# Patient Record
Sex: FEMALE | Race: WHITE | NOT HISPANIC OR LATINO | Employment: PART TIME | URBAN - METROPOLITAN AREA
[De-identification: names, ages, dates, MRNs, and addresses within clinical notes are randomized per-mention and may not be internally consistent; named-entity substitution may affect disease eponyms.]

---

## 2017-02-23 ENCOUNTER — ALLSCRIPTS OFFICE VISIT (OUTPATIENT)
Dept: OTHER | Facility: OTHER | Age: 37
End: 2017-02-23

## 2017-03-08 ENCOUNTER — GENERIC CONVERSION - ENCOUNTER (OUTPATIENT)
Dept: OTHER | Facility: OTHER | Age: 37
End: 2017-03-08

## 2017-03-08 LAB
A/G RATIO (HISTORICAL): 1.9 (ref 1.1–2.5)
ALBUMIN SERPL BCP-MCNC: 4.3 G/DL (ref 3.5–5.5)
ALP SERPL-CCNC: 45 IU/L (ref 39–117)
ALT SERPL W P-5'-P-CCNC: 10 IU/L (ref 0–32)
AST SERPL W P-5'-P-CCNC: 15 IU/L (ref 0–40)
BILIRUB SERPL-MCNC: 0.4 MG/DL (ref 0–1.2)
BUN SERPL-MCNC: 13 MG/DL (ref 6–20)
BUN/CREA RATIO (HISTORICAL): 16 (ref 8–20)
CALCIUM SERPL-MCNC: 8.9 MG/DL (ref 8.7–10.2)
CHLORIDE SERPL-SCNC: 106 MMOL/L (ref 96–106)
CHOLEST SERPL-MCNC: 150 MG/DL (ref 100–199)
CO2 SERPL-SCNC: 23 MMOL/L (ref 18–29)
CREAT SERPL-MCNC: 0.79 MG/DL (ref 0.57–1)
EGFR AFRICAN AMERICAN (HISTORICAL): 111 ML/MIN/1.73
EGFR-AMERICAN CALC (HISTORICAL): 96 ML/MIN/1.73
GLUCOSE SERPL-MCNC: 106 MG/DL (ref 65–99)
HDLC SERPL-MCNC: 45 MG/DL
LDLC SERPL CALC-MCNC: 96 MG/DL (ref 0–99)
POTASSIUM SERPL-SCNC: 3.9 MMOL/L (ref 3.5–5.2)
SODIUM SERPL-SCNC: 141 MMOL/L (ref 134–144)
TOT. GLOBULIN, SERUM (HISTORICAL): 2.3 G/DL (ref 1.5–4.5)
TOTAL PROTEIN (HISTORICAL): 6.6 G/DL (ref 6–8.5)
TRIGL SERPL-MCNC: 47 MG/DL (ref 0–149)
TSH SERPL DL<=0.05 MIU/L-ACNC: 0.63 UIU/ML (ref 0.45–4.5)
VLDLC SERPL CALC-MCNC: 9 MG/DL (ref 5–40)

## 2017-07-24 ENCOUNTER — GENERIC CONVERSION - ENCOUNTER (OUTPATIENT)
Dept: OTHER | Facility: OTHER | Age: 37
End: 2017-07-24

## 2017-07-31 ENCOUNTER — GENERIC CONVERSION - ENCOUNTER (OUTPATIENT)
Dept: OTHER | Facility: OTHER | Age: 37
End: 2017-07-31

## 2017-07-31 LAB
AMBIG ABBREV CMP14 DEFAULT (HISTORICAL): NORMAL
BASOPHILS # BLD AUTO: 0 %
BASOPHILS # BLD AUTO: 0 X10E3/UL (ref 0–0.2)
DEPRECATED RDW RBC AUTO: 13.9 % (ref 12.3–15.4)
EOSINOPHIL # BLD AUTO: 0 %
EOSINOPHIL # BLD AUTO: 0 X10E3/UL (ref 0–0.4)
HCT VFR BLD AUTO: 35.8 % (ref 34–46.6)
HGB BLD-MCNC: 12.1 G/DL (ref 11.1–15.9)
IMM.GRANULOCYTES (CD4/8) (HISTORICAL): 0 %
IMM.GRANULOCYTES (CD4/8) (HISTORICAL): 0 X10E3/UL (ref 0–0.1)
LYMPHOCYTES # BLD AUTO: 1.7 X10E3/UL (ref 0.7–3.1)
LYMPHOCYTES # BLD AUTO: 31 %
MCH RBC QN AUTO: 30.9 PG (ref 26.6–33)
MCHC RBC AUTO-ENTMCNC: 33.8 G/DL (ref 31.5–35.7)
MCV RBC AUTO: 91 FL (ref 79–97)
MONOCYTES # BLD AUTO: 0.6 X10E3/UL (ref 0.1–0.9)
MONOCYTES (HISTORICAL): 10 %
NEUTROPHILS # BLD AUTO: 3.2 X10E3/UL (ref 1.4–7)
NEUTROPHILS # BLD AUTO: 59 %
PLATELET # BLD AUTO: 179 X10E3/UL (ref 150–379)
RBC (HISTORICAL): 3.92 X10E6/UL (ref 3.77–5.28)
WBC # BLD AUTO: 5.4 X10E3/UL (ref 3.4–10.8)

## 2017-08-01 LAB
A/G RATIO (HISTORICAL): 1.9 (ref 1.2–2.2)
ALBUMIN SERPL BCP-MCNC: 4.2 G/DL (ref 3.5–5.5)
ALP SERPL-CCNC: 41 IU/L (ref 39–117)
ALT SERPL W P-5'-P-CCNC: 10 IU/L (ref 0–32)
AST SERPL W P-5'-P-CCNC: 12 IU/L (ref 0–40)
BILIRUB SERPL-MCNC: 0.6 MG/DL (ref 0–1.2)
BUN SERPL-MCNC: 12 MG/DL (ref 6–20)
BUN/CREA RATIO (HISTORICAL): 18 (ref 9–23)
CALCIUM SERPL-MCNC: 9.1 MG/DL (ref 8.7–10.2)
CHLORIDE SERPL-SCNC: 104 MMOL/L (ref 96–106)
CO2 SERPL-SCNC: 24 MMOL/L (ref 18–29)
CREAT SERPL-MCNC: 0.68 MG/DL (ref 0.57–1)
EGFR AFRICAN AMERICAN (HISTORICAL): 129 ML/MIN/1.73
EGFR-AMERICAN CALC (HISTORICAL): 112 ML/MIN/1.73
GLUCOSE SERPL-MCNC: 93 MG/DL (ref 65–99)
POTASSIUM SERPL-SCNC: 4.3 MMOL/L (ref 3.5–5.2)
SODIUM SERPL-SCNC: 139 MMOL/L (ref 134–144)
TOT. GLOBULIN, SERUM (HISTORICAL): 2.2 G/DL (ref 1.5–4.5)
TOTAL PROTEIN (HISTORICAL): 6.4 G/DL (ref 6–8.5)

## 2018-01-15 NOTE — RESULT NOTES
Verified Results  [facility]) CMP14+eGFR [id number] 09:26AM Dangelo Snell     Test Name Result Flag Reference   Glucose, Serum 106 mg/dL H 65-99   BUN 13 mg/dL  6-20   Creatinine, Serum 0 79 mg/dL  0 57-1 00   eGFR If NonAfricn Am 96 mL/min/1 73  >59   eGFR If Africn Am 111 mL/min/1 73  >59   BUN/Creatinine Ratio 16  8-20   ALT (SGPT) 10 IU/L  0-32   Albumin, Serum 4 3 g/dL  3 5-5 5   Globulin, Total 2 3 g/dL  1 5-4 5   A/G Ratio 1 9  1 1-2 5   **Effective March 13, 2017 the reference interval**                   for A/G Ratio will be changing to: Age                Male          Female                           0 -  7 days       1 1 - 2 3       1 1 - 2 3                           8 - 30 days       1 2 - 2 8       1 2 - 2 8                           1 -  6 months     1 3 - 3 6       1 3 - 3 6                    7 months -  5 years      1 5 - 2 6       1 5 - 2 6                              > 5 years      1 2 - 2 2       1 2 - 2 2   Bilirubin, Total 0 4 mg/dL  0 0-1 2   Alkaline Phosphatase, S 45 IU/L     AST (SGOT) 15 IU/L  0-40   Sodium, Serum 141 mmol/L  134-144   Potassium, Serum 3 9 mmol/L  3 5-5 2   Chloride, Serum 106 mmol/L     Carbon Dioxide, Total 23 mmol/L  18-29   Calcium, Serum 8 9 mg/dL  8 7-10 2   Protein, Total, Serum 6 6 g/dL  6 0-8 5   Glucose, Serum 106 mg/dL H 65-99   BUN 13 mg/dL  6-20   Creatinine, Serum 0 79 mg/dL  0 57-1 00   eGFR If NonAfricn Am 96 mL/min/1 73  >59   eGFR If Africn Am 111 mL/min/1 73  >59   BUN/Creatinine Ratio 16  8-20   ALT (SGPT) 10 IU/L  0-32   Albumin, Serum 4 3 g/dL  3 5-5 5   Globulin, Total 2 3 g/dL  1 5-4 5   A/G Ratio 1 9  1 1-2 5   **Effective March 13, 2017 the reference interval**                   for A/G Ratio will be changing to:                               Age                Male          Female                           0 -  7 days       1 1 - 2 3       1 1 - 2 3                           8 - 30 days       1 2 - 2 8 1 2 - 2 8                           1 -  6 months     1 3 - 3 6       1 3 - 3 6                    7 months -  5 years      1 5 - 2 6       1 5 - 2 6                              > 5 years      1 2 - 2 2       1 2 - 2 2   Bilirubin, Total 0 4 mg/dL  0 0-1 2   Alkaline Phosphatase, S 45 IU/L     AST (SGOT) 15 IU/L  0-40   Sodium, Serum 141 mmol/L  134-144   Potassium, Serum 3 9 mmol/L  3 5-5 2   Chloride, Serum 106 mmol/L     Carbon Dioxide, Total 23 mmol/L  18-29   Calcium, Serum 8 9 mg/dL  8 7-10 2   Protein, Total, Serum 6 6 g/dL  6 0-8 5           (LC) Lipid Panel [id number] 09:26AM [employer]     Test Name Result Flag Reference   Cholesterol, Total 150 mg/dL  100-199   Triglycerides 47 mg/dL  0-149   HDL Cholesterol 45 mg/dL  >39   VLDL Cholesterol Carlo 9 mg/dL  5-40   LDL Cholesterol Calc 96 mg/dL  0-99   Cholesterol, Total 150 mg/dL  100-199   Triglycerides 47 mg/dL  0-149   HDL Cholesterol 45 mg/dL  >39   VLDL Cholesterol Carlo 9 mg/dL  5-40   LDL Cholesterol Calc 96 mg/dL  0-99           (1) TSH [id number] 09:26AM [employer]     Test Name Result Flag Reference   TSH 0 632 uIU/mL  0 450-4 500

## 2018-01-22 VITALS
SYSTOLIC BLOOD PRESSURE: 122 MMHG | HEIGHT: 67 IN | HEART RATE: 78 BPM | DIASTOLIC BLOOD PRESSURE: 72 MMHG | OXYGEN SATURATION: 99 % | WEIGHT: 179 LBS | BODY MASS INDEX: 28.09 KG/M2

## 2019-01-08 ENCOUNTER — HOSPITAL ENCOUNTER (EMERGENCY)
Facility: HOSPITAL | Age: 39
Discharge: HOME | End: 2019-01-08
Attending: EMERGENCY MEDICINE
Payer: COMMERCIAL

## 2019-01-08 ENCOUNTER — APPOINTMENT (EMERGENCY)
Dept: CT IMAGING | Facility: HOSPITAL | Age: 39
End: 2019-01-08
Payer: COMMERCIAL

## 2019-01-08 VITALS
OXYGEN SATURATION: 100 % | DIASTOLIC BLOOD PRESSURE: 64 MMHG | HEART RATE: 57 BPM | WEIGHT: 161 LBS | HEIGHT: 67 IN | SYSTOLIC BLOOD PRESSURE: 106 MMHG | BODY MASS INDEX: 25.27 KG/M2 | RESPIRATION RATE: 16 BRPM | TEMPERATURE: 98.1 F

## 2019-01-08 DIAGNOSIS — E05.90 HYPERTHYROIDISM: ICD-10-CM

## 2019-01-08 DIAGNOSIS — E04.1 THYROID NODULE: Primary | ICD-10-CM

## 2019-01-08 DIAGNOSIS — R20.2 PARESTHESIAS: ICD-10-CM

## 2019-01-08 LAB
ALBUMIN SERPL BCP-MCNC: 4 G/DL (ref 3.5–5)
ALP SERPL-CCNC: 53 U/L (ref 46–116)
ALT SERPL W P-5'-P-CCNC: 26 U/L (ref 12–78)
ANION GAP SERPL CALCULATED.3IONS-SCNC: 9 MMOL/L (ref 4–13)
AST SERPL W P-5'-P-CCNC: 13 U/L (ref 5–45)
BASOPHILS # BLD AUTO: 0.01 THOUSANDS/ΜL (ref 0–0.1)
BASOPHILS NFR BLD AUTO: 0 % (ref 0–1)
BILIRUB SERPL-MCNC: 0.7 MG/DL (ref 0.2–1)
BUN SERPL-MCNC: 15 MG/DL (ref 5–25)
CALCIUM SERPL-MCNC: 8.8 MG/DL (ref 8.3–10.1)
CHLORIDE SERPL-SCNC: 103 MMOL/L (ref 100–108)
CO2 SERPL-SCNC: 27 MMOL/L (ref 21–32)
CREAT SERPL-MCNC: 0.79 MG/DL (ref 0.6–1.3)
EOSINOPHIL # BLD AUTO: 0 THOUSAND/ΜL (ref 0–0.61)
EOSINOPHIL NFR BLD AUTO: 0 % (ref 0–6)
ERYTHROCYTE [DISTWIDTH] IN BLOOD BY AUTOMATED COUNT: 12.7 % (ref 11.6–15.1)
GFR SERPL CREATININE-BSD FRML MDRD: 95 ML/MIN/1.73SQ M
GLUCOSE SERPL-MCNC: 83 MG/DL (ref 65–140)
HCT VFR BLD AUTO: 41 % (ref 34.8–46.1)
HGB BLD-MCNC: 14.2 G/DL (ref 11.5–15.4)
IMM GRANULOCYTES # BLD AUTO: 0.01 THOUSAND/UL (ref 0–0.2)
IMM GRANULOCYTES NFR BLD AUTO: 0 % (ref 0–2)
LYMPHOCYTES # BLD AUTO: 2.01 THOUSANDS/ΜL (ref 0.6–4.47)
LYMPHOCYTES NFR BLD AUTO: 28 % (ref 14–44)
MAGNESIUM SERPL-MCNC: 1.9 MG/DL (ref 1.6–2.6)
MCH RBC QN AUTO: 30.8 PG (ref 26.8–34.3)
MCHC RBC AUTO-ENTMCNC: 34.6 G/DL (ref 31.4–37.4)
MCV RBC AUTO: 89 FL (ref 82–98)
MONOCYTES # BLD AUTO: 0.69 THOUSAND/ΜL (ref 0.17–1.22)
MONOCYTES NFR BLD AUTO: 10 % (ref 4–12)
NEUTROPHILS # BLD AUTO: 4.42 THOUSANDS/ΜL (ref 1.85–7.62)
NEUTS SEG NFR BLD AUTO: 62 % (ref 43–75)
PHOSPHATE SERPL-MCNC: 2.7 MG/DL (ref 2.7–4.5)
PLATELET # BLD AUTO: 228 THOUSANDS/UL (ref 149–390)
PMV BLD AUTO: 11.3 FL (ref 8.9–12.7)
POTASSIUM SERPL-SCNC: 3.6 MMOL/L (ref 3.5–5.3)
PROT SERPL-MCNC: 7.9 G/DL (ref 6.4–8.2)
RBC # BLD AUTO: 4.61 MILLION/UL (ref 3.81–5.12)
SODIUM SERPL-SCNC: 139 MMOL/L (ref 136–145)
TSH SERPL DL<=0.05 MIU/L-ACNC: 0.28 UIU/ML (ref 0.36–3.74)
WBC # BLD AUTO: 7.14 THOUSAND/UL (ref 4.31–10.16)

## 2019-01-08 PROCEDURE — 70498 CT ANGIOGRAPHY NECK: CPT

## 2019-01-08 PROCEDURE — 80053 COMPREHEN METABOLIC PANEL: CPT | Performed by: EMERGENCY MEDICINE

## 2019-01-08 PROCEDURE — 99285 EMERGENCY DEPT VISIT HI MDM: CPT

## 2019-01-08 PROCEDURE — 70496 CT ANGIOGRAPHY HEAD: CPT

## 2019-01-08 PROCEDURE — 85025 COMPLETE CBC W/AUTO DIFF WBC: CPT | Performed by: EMERGENCY MEDICINE

## 2019-01-08 PROCEDURE — 84100 ASSAY OF PHOSPHORUS: CPT | Performed by: EMERGENCY MEDICINE

## 2019-01-08 PROCEDURE — 36415 COLL VENOUS BLD VENIPUNCTURE: CPT | Performed by: EMERGENCY MEDICINE

## 2019-01-08 PROCEDURE — 84443 ASSAY THYROID STIM HORMONE: CPT | Performed by: EMERGENCY MEDICINE

## 2019-01-08 PROCEDURE — 83735 ASSAY OF MAGNESIUM: CPT | Performed by: EMERGENCY MEDICINE

## 2019-01-08 RX ADMIN — IOHEXOL 100 ML: 350 INJECTION, SOLUTION INTRAVENOUS at 17:19

## 2019-01-08 NOTE — DISCHARGE INSTRUCTIONS
IMPRESSION:     No acute intracranial abnormality      No focal stenosis or saccular aneurysm within the [place]      No hemodynamically significant stenosis within either common or internal carotid artery  Less than 50% stenosis by NASCET criteria     19 mm left thyroid lobe nodule  A nonemergent thyroid ultrasound is recommended for further characterization     TSH 0 283      Hyperthyroidism   WHAT YOU NEED TO KNOW:   Hyperthyroidism is a condition that develops when your thyroid hormone levels are high  Thyroid hormones help control body temperature, heart rate, growth, and weight  DISCHARGE INSTRUCTIONS:   Call 911 for any of the following:   · You have sudden chest pain or shortness of breath  · You have a seizure  · Your heart is beating faster than usual     · You feel like you are going to faint  Contact your healthcare provider if:   · You have a fever  · You feel nervous and restless  · You have chills, a cough, or feel weak and achy  · You run out of medicine or have stopped taking it  · You have questions or concerns about your condition or care  Medicines:   · Antithyroid medicines  decrease thyroid hormone levels and your symptoms  · Take your medicine as directed  Contact your healthcare provider if you think your medicine is not helping or if you have side effects  Tell him or her if you are allergic to any medicine  Keep a list of the medicines, vitamins, and herbs you take  Include the amounts, and when and why you take them  Bring the list or the pill bottles to follow-up visits  Carry your medicine list with you in case of an emergency  Rest as directed: You many need to avoid stressful and heavy physical activities  Slowly start to do more each day  Return to your daily activities as directed  Nutrition:  You may need to eat more to give your body the extra energy it needs  Foods high in protein will help prevent weight loss   Ask your healthcare provider which foods are best for you  Follow up with your healthcare provider as directed: You may need to return for more blood tests to check your thyroid hormone level  This will show if you are getting the right amount of medicine  Do not stop taking your medicines without talking to your healthcare provider first  Write down your questions so you remember to ask them during your visits  © 2017 [employer] Information is for End User's use only and may not be sold, redistributed or otherwise used for commercial purposes  All illustrations and images included in CareNotes® are the copyrighted property of [employer] , [employer]  or Tato Swartz  The above information is an  only  It is not intended as medical advice for individual conditions or treatments  Talk to your doctor, nurse or pharmacist before following any medical regimen to see if it is safe and effective for you

## 2019-01-08 NOTE — ED NOTES
IV discontinued by Dr Lynette Brooks and discharge instructions given     Sharlene Llanos, PASCALE  01/08/19 9401

## 2019-01-08 NOTE — ED NOTES
Pt reports first having issues with vision 12/31/2018 while she was at work  Pt states " I was trying to write an email and remember being like I can't even read this  And my coworkers said there wasn't anything on the screen"   Pt continues "Yesterday at [facility] I had just had like this issue with my speech like I knew what I wanted to say but just couldn't say it"     Shruthi Holden RN  01/08/19 8151

## 2019-01-08 NOTE — ED PROVIDER NOTES
History  Chief Complaint   Patient presents with    CVA/TIA-like Symptoms     Patient states she was at [facility] last nioght and had an episode that last 15 minutes, she was having difficulties speaching and had garbled speech, Patient states she is still having issues with her periopheral vision  which is intermittent  She states her vision is blurry on the L side  HPI     43 yof p/w multiple complaints  Patient has been having recurrent issues since new years whrre she gets perioral numbness, tunnel vision for a few minutes at a time  Patient reports last night she was a [employer] and felt like she had an episode of word finding  She felt better so didn't come to the hospital  She reports feeling ok today but feels like she has alternating eye visual changes  she denies headache, weakness, cp, sob  No other a/e factor  Intermitent  Patient does admit to a lot of stress  She appears very stressful on exam and becomes tachycardic when talking about her symptoms  Exam reveals a normal neurologic exam without any deficits  visual fields full to confrontation  CN intact  A/P: dysarthria, visual field changes  Inconsistent symptoms  No risk factors for TIA  Normal neurologic exam  Will however check CTA, labs  Regardless of findings will need to follow up with pcp  None       History reviewed  No pertinent past medical history  Past Surgical History:   Procedure Laterality Date    ABDOMINAL SURGERY      APPENDECTOMY         Family History   Problem Relation Age of Onset    Hypertension Mother     Hyperlipidemia Mother     Hypertension Father     Hyperlipidemia Father      I have reviewed and agree with the history as documented  Social History   Substance Use Topics    Smoking status: Never Smoker    Smokeless tobacco: Never Used    Alcohol use Yes        Review of Systems   Constitutional: Negative for chills, fatigue and fever  Eyes: Negative for photophobia and visual disturbance  Respiratory: Negative for cough and shortness of breath  Cardiovascular: Negative for chest pain, palpitations and leg swelling  Gastrointestinal: Negative for diarrhea, nausea and vomiting  Endocrine: Negative for polydipsia and polyuria  Genitourinary: Negative for decreased urine volume, difficulty urinating, dysuria and frequency  Musculoskeletal: Negative for back pain, neck pain and neck stiffness  Skin: Negative for color change and rash  Allergic/Immunologic: Negative for environmental allergies and immunocompromised state  Neurological: Positive for speech difficulty  Negative for dizziness and headaches  Hematological: Negative for adenopathy  Does not bruise/bleed easily  Psychiatric/Behavioral: Negative for dysphoric mood  The patient is not nervous/anxious  Physical Exam  Physical Exam   Constitutional: She is oriented to person, place, and time  She appears well-developed and well-nourished  No distress  HENT:   Head: Normocephalic and atraumatic  Nose: Nose normal    Eyes: Pupils are equal, round, and reactive to light  Conjunctivae and EOM are normal  No scleral icterus  Neck: Normal range of motion  Neck supple  No JVD present  No tracheal deviation present  No thyromegaly present  Cardiovascular: Normal rate, regular rhythm, normal heart sounds and intact distal pulses  Exam reveals no gallop and no friction rub  Pulmonary/Chest: Effort normal and breath sounds normal  No respiratory distress  She has no wheezes  She has no rales  She exhibits no tenderness  Abdominal: Soft  Bowel sounds are normal  She exhibits no distension and no mass  There is no tenderness  There is no rebound and no guarding  No hernia  Musculoskeletal: Normal range of motion  She exhibits no edema, tenderness or deformity  Neurological: She is alert and oriented to person, place, and time  She has normal reflexes  No cranial nerve deficit   Coordination normal    Skin: Skin is warm and dry  She is not diaphoretic  No erythema  Psychiatric: She has a normal mood and affect  Her behavior is normal    Nursing note and vitals reviewed  Vital Signs  ED Triage Vitals [01/08/19 1437]   Temperature Pulse Respirations Blood Pressure SpO2   98 1 °F (36 7 °C) 82 (!) 24 150/100 100 %      Temp src Heart Rate Source Patient Position - Orthostatic VS BP Location FiO2 (%)   -- -- -- -- --      Pain Score       No Pain           Vitals:    01/08/19 1600 01/08/19 1615 01/08/19 1630 01/08/19 1645   BP: 108/69 106/67 106/65 106/64   Pulse: 62 60 57 57       Visual Acuity  Visual Acuity      Most Recent Value   L Pupil Size (mm)  3   R Pupil Size (mm)  3          ED Medications  Medications   iohexol (OMNIPAQUE) 350 MG/ML injection (MULTI-DOSE) 100 mL (100 mL Intravenous Given 1/8/19 1719)       Diagnostic Studies  Results Reviewed     Procedure Component Value Units Date/Time    Comprehensive metabolic panel [[id number]] Collected:  01/08/19 1530    Lab Status:  Final result Specimen:  Blood from Arm, Right Updated:  01/08/19 1615     Sodium 139 mmol/L      Potassium 3 6 mmol/L      Chloride 103 mmol/L      CO2 27 mmol/L      ANION GAP 9 mmol/L      BUN 15 mg/dL      Creatinine 0 79 mg/dL      Glucose 83 mg/dL      Calcium 8 8 mg/dL      AST 13 U/L      ALT 26 U/L      Alkaline Phosphatase 53 U/L      Total Protein 7 9 g/dL      Albumin 4 0 g/dL      Total Bilirubin 0 70 mg/dL      eGFR 95 ml/min/1 73sq m     Narrative:         National Kidney Disease Education Program recommendations are as follows:  GFR calculation is accurate only with a steady state creatinine  Chronic Kidney disease less than 60 ml/min/1 73 sq  meters  Kidney failure less than 15 ml/min/1 73 sq  meters      TSH [[id number]]  (Abnormal) Collected:  01/08/19 1530    Lab Status:  Final result Specimen:  Blood from Arm, Right Updated:  01/08/19 1615     TSH 3RD GENERATON 0 283 (L) uIU/mL     Narrative:         Patients undergoing fluorescein dye angiography may retain small amounts of fluorescein in the body for 48-72 hours post procedure  Samples containing fluorescein can produce falsely depressed TSH values  If the patient had this procedure,a specimen should be resubmitted post fluorescein clearance  The recommended reference ranges for TSH during pregnancy are as follows:  First trimester 0 1 to 2 5 uIU/mL  Second trimester  0 2 to 3 0 uIU/mL  Third trimester 0 3 to 3 0 uIU/m      Magnesium [[id number]]  (Normal) Collected:  01/08/19 1530    Lab Status:  Final result Specimen:  Blood from Arm, Right Updated:  01/08/19 1615     Magnesium 1 9 mg/dL     Phosphorus [[id number]]  (Normal) Collected:  01/08/19 1530    Lab Status:  Final result Specimen:  Blood from Arm, Right Updated:  01/08/19 1615     Phosphorus 2 7 mg/dL     CBC and differential [[id number]]  (Normal) Collected:  01/08/19 1530    Lab Status:  Final result Specimen:  Blood from Arm, Right Updated:  01/08/19 1548     WBC 7 14 Thousand/uL      RBC 4 61 Million/uL      Hemoglobin 14 2 g/dL      Hematocrit 41 0 %      MCV 89 fL      MCH 30 8 pg      MCHC 34 6 g/dL      RDW 12 7 %      MPV 11 3 fL      Platelets 485 Thousands/uL      Neutrophils Relative 62 %      Immat GRANS % 0 %      Lymphocytes Relative 28 %      Monocytes Relative 10 %      Eosinophils Relative 0 %      Basophils Relative 0 %      Neutrophils Absolute 4 42 Thousands/µL      Immature Grans Absolute 0 01 Thousand/uL      Lymphocytes Absolute 2 01 Thousands/µL      Monocytes Absolute 0 69 Thousand/µL      Eosinophils Absolute 0 00 Thousand/µL      Basophils Absolute 0 01 Thousands/µL                  CTA head and neck with and without contrast   Final Result by Tabitha Del Castillo MD (01/08 9701)      No acute intracranial abnormality  No focal stenosis or saccular aneurysm within the [place] of [place]  No hemodynamically significant stenosis within either common or internal carotid artery  Less than 50% stenosis by NASCET criteria  19 mm left thyroid lobe nodule  A nonemergent thyroid ultrasound is recommended for further characterization            Workstation performed: [id number]                    Procedures  Procedures       Phone Contacts  ED Phone Contact    ED Course                               MDM  Number of Diagnoses or Management Options  Hyperthyroidism: new and requires workup  Paresthesias: new and requires workup  Thyroid nodule: new and requires workup  Diagnosis management comments: Patient had a normal cta other than thyroid nodule- she reports knowing about this but never had biopsy  tsh low  Will f u pcp  If symptoms persist that she has been having, patient will need to follow up with pcp or if symptoms last for longer than a few seconds, return to ED       Amount and/or Complexity of Data Reviewed  Clinical lab tests: reviewed and ordered  Tests in the radiology section of CPT®: reviewed and ordered  Tests in the medicine section of CPT®: ordered and reviewed  Independent visualization of images, tracings, or specimens: yes    Patient Progress  Patient progress: stable    CritCare Time    Disposition  Final diagnoses:   Thyroid nodule   Hyperthyroidism   Paresthesias     Time reflects when diagnosis was documented in both MDM as applicable and the Disposition within this note     Time User Action Codes Description Comment    1/8/2019  5:58 PM Dede Lundborg Add [E04 1] Thyroid nodule     1/8/2019  5:58 PM Dede Lundborg Add [E05 90] Hyperthyroidism     1/9/2019 11:26 AM Dede Lundborg Add [R20 2] Paresthesias       ED Disposition     ED Disposition Condition Comment    Discharge  Carolyn Chiu discharge to home/self care      Condition at discharge: Good        Follow-up Information     Follow up With Specialties Details Why Contact Info Additional [address] [facility] Emergency Department Emergency Medicine   [address] [place] [address] [address] [place] [address]  0 [facility], [place] [address], [place], [place], [address], MD Family Medicine In 1 week  [address]  [address] [facility] [address]  [address] [address] [phone]             There are no discharge medications for this patient  No discharge procedures on file      ED Provider  Electronically Signed by           Mireya Tian DO  01/09/19 1128

## 2019-01-09 DIAGNOSIS — G45.9 TIA (TRANSIENT ISCHEMIC ATTACK): ICD-10-CM

## 2019-01-09 DIAGNOSIS — E55.9 VITAMIN D DEFICIENCY: ICD-10-CM

## 2019-01-09 DIAGNOSIS — R53.83 FATIGUE, UNSPECIFIED TYPE: Primary | ICD-10-CM

## 2019-01-09 DIAGNOSIS — E53.8 VITAMIN B12 DEFICIENCY: ICD-10-CM

## 2019-01-10 LAB
25(OH)D3+25(OH)D2 SERPL-MCNC: 23.2 NG/ML (ref 30–100)
ENDOMYSIUM IGA SER QL: NEGATIVE
ERYTHROCYTE [SEDIMENTATION RATE] IN BLOOD BY WESTERGREN METHOD: 2 MM/HR (ref 0–32)
GLIADIN PEPTIDE IGA SER-ACNC: 4 UNITS (ref 0–19)
GLIADIN PEPTIDE IGG SER-ACNC: 2 UNITS (ref 0–19)
IGA SERPL-MCNC: 188 MG/DL (ref 87–352)
THYROGLOB AB SERPL-ACNC: <1 IU/ML (ref 0–0.9)
THYROGLOB SERPL-MCNC: 39.8 NG/ML (ref 1.5–38.5)
THYROPEROXIDASE AB SERPL-ACNC: 11 IU/ML (ref 0–34)
TTG IGA SER-ACNC: <2 U/ML (ref 0–3)
TTG IGG SER-ACNC: <2 U/ML (ref 0–5)
VIT B12 SERPL-MCNC: 1016 PG/ML (ref 232–1245)

## 2019-01-15 ENCOUNTER — VBI (OUTPATIENT)
Dept: ADMINISTRATIVE | Facility: OTHER | Age: 39
End: 2019-01-15

## 2019-01-15 NOTE — TELEPHONE ENCOUNTER
Henrik Duran    ED Visit Information     Ed visit date: 01/08/2019  Diagnosis Description: Thyroid nodule; Hyperthyroidism  In Network? Yes [address]  Discharge status: Home  Discharged with meds ? No  Number of ED visits to date: 1  ED Severity:N/a     Outreach Information    Outreach successful: Yes 1  Date letter mailed:n/a  Date Finalized:01/15/2019    Care Coordination    Follow up appointment with pcp: no None scheduled  Transportation issues ? NA    Value [employer] type:  7 Day Outreach  Emergent necessity warranted by diagnosis:  Yes  [facility] PCP:  Yes  Transportation:  Self Transport  Called PCP first?:  No  Feels able to call PCP for urgent problems ?:  Yes  Understands what emergencies can be handled by PCP ?:  Yes  Practice Contacted Patient ?:  No  Pt had ED follow up with pcp/staff ?:  Yes Call with NO visit scheduled   Reason Patient went to ED instead of Urgent Care or PCP?:  Perceived Severity of Illness  Urgent care Education?:  No  01/15/2019 09:28 AM Phone (Kerry Martins) [place] (Self) [phone] (H)   Call Complete    Personal communication with patient regarding her recent ED visit on 01/08/2019 for Thyroid nodule; Hyperthyroidism  Rachelle Jenkins stated that she is feeling fine  Patient was discharged with without medication and was advised to follow up with Dr Freitas within 7 days of visit  She stated that she spoke with Dr Whitley Farrar and he ordered additional labs  Patient had an appt scheduled for 01/14 but she canceled it  Patient does not meet OPCM criteria  I was not able to ask follow up questions

## 2020-08-31 ENCOUNTER — OFFICE VISIT (OUTPATIENT)
Dept: OBGYN CLINIC | Facility: CLINIC | Age: 40
End: 2020-08-31
Payer: COMMERCIAL

## 2020-08-31 VITALS
BODY MASS INDEX: 27.78 KG/M2 | DIASTOLIC BLOOD PRESSURE: 76 MMHG | TEMPERATURE: 98 F | HEIGHT: 67 IN | SYSTOLIC BLOOD PRESSURE: 122 MMHG | WEIGHT: 177 LBS

## 2020-08-31 DIAGNOSIS — Z12.4 CERVICAL CANCER SCREENING: Primary | ICD-10-CM

## 2020-08-31 DIAGNOSIS — Z30.431 ENCOUNTER FOR ROUTINE CHECKING OF INTRAUTERINE CONTRACEPTIVE DEVICE (IUD): ICD-10-CM

## 2020-08-31 DIAGNOSIS — Z12.31 ENCOUNTER FOR SCREENING MAMMOGRAM FOR MALIGNANT NEOPLASM OF BREAST: ICD-10-CM

## 2020-08-31 DIAGNOSIS — Z01.419 ENCOUNTER FOR GYNECOLOGICAL EXAMINATION WITHOUT ABNORMAL FINDING: ICD-10-CM

## 2020-08-31 DIAGNOSIS — Z11.51 SCREENING FOR HPV (HUMAN PAPILLOMAVIRUS): ICD-10-CM

## 2020-08-31 PROCEDURE — 87624 HPV HI-RISK TYP POOLED RSLT: CPT | Performed by: OBSTETRICS & GYNECOLOGY

## 2020-08-31 PROCEDURE — 99386 PREV VISIT NEW AGE 40-64: CPT | Performed by: OBSTETRICS & GYNECOLOGY

## 2020-08-31 PROCEDURE — G0145 SCR C/V CYTO,THINLAYER,RESCR: HCPCS | Performed by: OBSTETRICS & GYNECOLOGY

## 2020-08-31 NOTE — PROGRESS NOTES
CC:  Annual exam    HPI: Michelle Clark presents for routine gyn exam   The patient is new to our office and offers no complaints or concerns at this time  She does have a Mirena IUD which is nearing its expiration date next month  She would like a replacement  Past Medical History:  History reviewed  No pertinent past medical history  Past Surgical History:  Past Surgical History:   Procedure Laterality Date    ABDOMINAL SURGERY      APPENDECTOMY         Past OB/Gyn History:  Menstrual cycles are absent secondary to the Mirena affect  Denies any history of sexually transmitted infection  No history of abnormal pap smears  Her last pap smear was 2018 or 2017 but was normal     ALLERGIES: No Known Allergies    MEDS: No current outpatient medications on file  Family History:  Family History   Problem Relation Age of Onset   Alma Delia Daliay Hypertension Mother     Hyperlipidemia Mother     Hypertension Father     Hyperlipidemia Father        Social History:  Social History     Socioeconomic History    Marital status: /Civil Union     Spouse name: Not on file    Number of children: Not on file    Years of education: Not on file    Highest education level: Not on file   Occupational History    Not on file   Social Needs    Financial resource strain: Not on file    Food insecurity     Worry: Not on file     Inability: Not on file   [employer] needs     Medical: Not on file     Non-medical: Not on file   Tobacco Use    Smoking status: Never Smoker    Smokeless tobacco: Never Used   Substance and Sexual Activity    Alcohol use:  Yes    Drug use: No    Sexual activity: Not on file   Lifestyle    Physical activity     Days per week: Not on file     Minutes per session: Not on file    Stress: Not on file   Relationships    Social connections     Talks on phone: Not on file     Gets together: Not on file     Attends Shinto service: Not on file     Active member of club or organization: Not on file     Attends meetings of clubs or organizations: Not on file     Relationship status: Not on file    Intimate partner violence     Fear of current or ex partner: Not on file     Emotionally abused: Not on file     Physically abused: Not on file     Forced sexual activity: Not on file   Other Topics Concern    Not on file   Social History Narrative    Not on file         Review of Systems:  Gen:   Denies fatigue, chills, nausea, vomiting, fever  Skin: No rashes or discolorations of any concern  RESP: Denies SOB, no cough  CV: Denies chest pain or palpitations  Breasts: Denies masses, pain, skin changes and nipple discharge  GI: Denies abdominal pain, heartburn, nausea, vomiting, changes in bowel habits  : Denies dysuria, frequency, CVA tenderness, incontinence and hematuria  Genitalia: Denies abnormal vaginal discharge, external lesions, rashes, pelvic pain, pressure, abnormal bleeding  Rectal:  Denies pain, bleeding, hemorrhoids,    Physical Exam:  /76   Temp 98 °F (36 7 °C)   Ht 5' 7" (1 702 m)   Wt 80 3 kg (177 lb)   BMI 27 72 kg/m²    Gen: The patient was alert and oriented x3, pleasant well-appearing female in no acute distress  Neck:  Unremarkable, no lymphadenopathy, no thyromegaly, or tenderness  CV:  RRR, no murmurs  Resp:  Clear to auscultation bilaterally, no wheezing  Breasts: Symmetric  No dominant, discrete, fixed  or suspicious masses are noted  No skin or nipple changes  No palpable axillary nodes  No supraclavicular adenopathy  Abd:  Soft, nontender, nondistended, no masses or organomegaly  Back:  No CVA tenderness, no tenderness to palpation along spine  Pelvic:  Normal appearing external female genitalia, no visible lesions, no rashes  Vagina is free of discharge, normal vaginal epithelium, no abnormal  lesions, no evidence of prolapse anteriorly or posteriorly  Normal appearing cervix, mobile and nontender  A thin prep pap smear was obtained today     Uterus is normal size, mobile and, nontender  No palpable adnexal masses or tenderness  No anoperineal lesions  Skin:  No concerning lesions  Extremeties: No edema      Assessment & Plan:   1  Routine annual exam      RTO one year orPRN  2  Encounter for cancer screening, Pap smear performed  3   Contraception, patient to return for IUD removal and replacement       4   Encounter for mammogram screening

## 2020-09-01 DIAGNOSIS — Z97.5 CONTRACEPTIVE DEVICE, INTRAUTERINE: ICD-10-CM

## 2020-09-01 DIAGNOSIS — Z97.5 CONTRACEPTIVE DEVICE, INTRAUTERINE: Primary | ICD-10-CM

## 2020-09-01 NOTE — TELEPHONE ENCOUNTER
Insurance called, was told that she can pick her IUD up at [facility]??  lets reorder  Opal Hernandez

## 2020-09-02 LAB
HPV HR 12 DNA CVX QL NAA+PROBE: NEGATIVE
HPV16 DNA CVX QL NAA+PROBE: NEGATIVE
HPV18 DNA CVX QL NAA+PROBE: NEGATIVE

## 2020-09-04 LAB
LAB AP GYN PRIMARY INTERPRETATION: NORMAL
Lab: NORMAL

## 2020-09-21 ENCOUNTER — OFFICE VISIT (OUTPATIENT)
Dept: OBGYN CLINIC | Facility: CLINIC | Age: 40
End: 2020-09-21
Payer: COMMERCIAL

## 2020-09-21 VITALS
DIASTOLIC BLOOD PRESSURE: 82 MMHG | SYSTOLIC BLOOD PRESSURE: 120 MMHG | BODY MASS INDEX: 28.04 KG/M2 | TEMPERATURE: 97.8 F | WEIGHT: 179 LBS

## 2020-09-21 DIAGNOSIS — Z30.433 ENCOUNTER FOR REMOVAL AND REINSERTION OF INTRAUTERINE CONTRACEPTIVE DEVICE (IUD): ICD-10-CM

## 2020-09-21 DIAGNOSIS — Z97.5 CONTRACEPTIVE DEVICE, INTRAUTERINE: Primary | ICD-10-CM

## 2020-09-21 LAB — SL AMB POCT URINE HCG: NORMAL

## 2020-09-21 PROCEDURE — 81025 URINE PREGNANCY TEST: CPT | Performed by: OBSTETRICS & GYNECOLOGY

## 2020-09-21 PROCEDURE — 58301 REMOVE INTRAUTERINE DEVICE: CPT | Performed by: OBSTETRICS & GYNECOLOGY

## 2020-09-21 PROCEDURE — 58300 INSERT INTRAUTERINE DEVICE: CPT | Performed by: OBSTETRICS & GYNECOLOGY

## 2020-09-21 NOTE — PROGRESS NOTES
Iud removal    Date/Time: 2020 4:40 PM  Performed by: Abbi Muñoz MD  Authorized by: Abbi Muñoz MD     Consent:     Consent obtained:  Written    Consent given by:  Patient    Procedure risks and benefits discussed: yes      Patient questions answered: yes      Patient agrees, verbalizes understanding, and wants to proceed: yes      Educational handouts given: no      Instructions and paperwork completed: yes    Procedure:     Removed with no complications: yes      Removal due to mechanical complications of IUD: no      Removal due to infection and inflammatory reaction: no      Other reason for removal:      Iud insertions    Date/Time: 2020 4:40 PM  Performed by: Abbi Muñoz MD  Authorized by:  Abbi Muñoz MD     Consent:     Consent obtained:  Written    Consent given by:  Patient    Procedure risks and benefits discussed: yes      Patient questions answered: yes      Patient agrees, verbalizes understanding, and wants to proceed: yes      Educational handouts given: yes      Instructions and paperwork completed: yes    Procedure:     Pelvic exam performed: yes      Negative urine pregnancy test: yes      Cervix cleaned and prepped: yes      Speculum placed in vagina: yes      Tenaculum applied to cervix: yes      Uterus sounded: yes      Uterus sound depth (cm):  8    IUD inserted with no complications: yes      IUD type:  Mirena    Strings trimmed: yes    Post-procedure:     Patient tolerated procedure well: yes      Patient will follow up after next period: no

## 2020-11-17 ENCOUNTER — HOSPITAL ENCOUNTER (OUTPATIENT)
Dept: BONE DENSITY | Facility: IMAGING CENTER | Age: 40
Discharge: HOME | End: 2020-11-17
Payer: COMMERCIAL

## 2020-11-17 VITALS — HEIGHT: 67 IN | BODY MASS INDEX: 28.25 KG/M2 | WEIGHT: 180 LBS

## 2020-11-17 DIAGNOSIS — Z12.31 ENCOUNTER FOR SCREENING MAMMOGRAM FOR MALIGNANT NEOPLASM OF BREAST: ICD-10-CM

## 2020-11-17 PROCEDURE — 77063 BREAST TOMOSYNTHESIS BI: CPT

## 2020-11-17 PROCEDURE — 77067 SCR MAMMO BI INCL CAD: CPT

## 2021-02-09 ENCOUNTER — TELEPHONE (OUTPATIENT)
Dept: FAMILY MEDICINE CLINIC | Facility: CLINIC | Age: 41
End: 2021-02-09

## 2021-02-09 NOTE — LETTER
Pt Name and Address    Dear Farrukh   Records from Insurance indicate that you are a patient of Ha Frazier MD, with [facility], [facility]  Please call the office to establish care and schedule your annual physical today at [phone]  If you are seeing a primary care provider other than the one assigned to you by your insurance, you can simply call the number on the back of your insurance card to change your primary care physician with your insurance company  We thank you for choosing [address] for your healthcare needs      Sincerely,    [facility]

## 2021-03-30 NOTE — TELEPHONE ENCOUNTER
03/30/21 1:05 PM     Thank you for your request  Your request has been received, reviewed, and the patient chart updated  The PCP has successfully been removed with a patient attribution note  This message will now be completed      Thank you  Gordon Duff

## 2021-06-04 ENCOUNTER — OFFICE VISIT (OUTPATIENT)
Dept: FAMILY MEDICINE CLINIC | Facility: CLINIC | Age: 41
End: 2021-06-04
Payer: COMMERCIAL

## 2021-06-04 VITALS
DIASTOLIC BLOOD PRESSURE: 76 MMHG | RESPIRATION RATE: 16 BRPM | OXYGEN SATURATION: 99 % | BODY MASS INDEX: 28.19 KG/M2 | HEART RATE: 62 BPM | HEIGHT: 67 IN | SYSTOLIC BLOOD PRESSURE: 124 MMHG

## 2021-06-04 DIAGNOSIS — Z00.00 WELLNESS EXAMINATION: Primary | ICD-10-CM

## 2021-06-04 DIAGNOSIS — R53.83 FATIGUE, UNSPECIFIED TYPE: ICD-10-CM

## 2021-06-04 DIAGNOSIS — E78.2 MIXED HYPERLIPIDEMIA: ICD-10-CM

## 2021-06-04 DIAGNOSIS — R52 PAIN: ICD-10-CM

## 2021-06-04 PROCEDURE — 99396 PREV VISIT EST AGE 40-64: CPT | Performed by: FAMILY MEDICINE

## 2021-06-04 RX ORDER — KETOROLAC TROMETHAMINE 10 MG/1
10 TABLET, FILM COATED ORAL EVERY 6 HOURS PRN
Qty: 30 TABLET | Refills: 3 | Status: SHIPPED | OUTPATIENT
Start: 2021-06-04

## 2021-06-04 NOTE — PROGRESS NOTES
HPI:  Collette Guild is a 38 y o  female here for her yearly health maintenance exam    There is no problem list on file for this patient  History reviewed  No pertinent past medical history  1  Advanced Directive: n     2  Durable Power of  for Healthcare: n     3  Social History:           Drug and alcohol History: n                  4  Immunizations up to date: y                 Lifestyle:                           Healthy Diet:y                          Alcohol Use:y                          Tobacco Use:n                          Regular exercise:y                          Weight concerns:n                               5  Over the past 2 weeks, how often have you been bothered by the following:              Little interest or pleasure in doing things:n              Felling down, depressed or hopeless:n       Current Outpatient Medications   Medication Sig Dispense Refill    levonorgestrel (MIRENA) 20 MCG/24HR IUD 1 Intra Uterine Device by Intrauterine route once for 1 dose Pt will bring to her appt 1 each 0     No current facility-administered medications for this visit  Allergies   Allergen Reactions    Chlorhexidine Hives, Itching and Swelling     Immunization History   Administered Date(s) Administered    Influenza Quadrivalent Preservative Free 3 years and older IM 10/10/2016    Tdap 08/29/2014       Patient Care Team:  Emily Trotter MD as PCP - General (Family Medicine)    Review of Systems   Constitutional: Negative for fatigue, fever and unexpected weight change  HENT: Negative for congestion, sinus pain and sore throat  Eyes: Negative for visual disturbance  Respiratory: Negative for shortness of breath and wheezing  Cardiovascular: Negative for chest pain and palpitations  Gastrointestinal: Negative for abdominal pain, nausea and vomiting  Musculoskeletal: Negative  Negative for arthralgias and myalgias  Neurological: Negative for syncope, weakness and numbness  Psychiatric/Behavioral: Negative  Negative for confusion, dysphoric mood and suicidal ideas  Physical Exam :  Physical Exam  Constitutional:       Appearance: She is well-developed  HENT:      Right Ear: Ear canal normal  Tympanic membrane is not injected  Left Ear: Ear canal normal  Tympanic membrane is not injected  Nose: Nose normal    Eyes:      General:         Right eye: No discharge  Left eye: No discharge  Conjunctiva/sclera: Conjunctivae normal       Pupils: Pupils are equal, round, and reactive to light  Neck:      Musculoskeletal: Normal range of motion and neck supple  Thyroid: No thyromegaly  Cardiovascular:      Rate and Rhythm: Normal rate and regular rhythm  Heart sounds: Normal heart sounds  No murmur  Pulmonary:      Effort: Pulmonary effort is normal  No respiratory distress  Breath sounds: Normal breath sounds  No wheezing  Abdominal:      General: Bowel sounds are normal  There is no distension  Palpations: Abdomen is soft  Tenderness: There is no abdominal tenderness  Musculoskeletal: Normal range of motion  Lymphadenopathy:      Cervical: No cervical adenopathy  Skin:     General: Skin is warm and dry  Neurological:      Mental Status: She is alert and oriented to person, place, and time  She is not disoriented  Sensory: No sensory deficit  Gait: Gait normal       Deep Tendon Reflexes: Reflexes are normal and symmetric  Psychiatric:         Speech: Speech normal          Behavior: Behavior normal          Thought Content: Thought content normal          Judgment: Judgment normal            Assessment and Plan:  1  Wellness examination     2  Mixed hyperlipidemia  Comprehensive metabolic panel    Lipid Panel with Direct LDL reflex    Comprehensive metabolic panel    Lipid Panel with Direct LDL reflex   3   Fatigue, unspecified type  CBC and differential    TSH, 3rd generation with Free T4 reflex    T4, free CBC and differential    TSH, 3rd generation with Free T4 reflex    T4, free       Health Maintenance Due   Topic Date Due    COVID-19 Vaccine (1) Never done    HIV Screening  Never done    BMI: Followup Plan  Never done    Annual Physical  08/31/2021    Influenza Vaccine (Season Ended) 09/01/2021    BMI: Adult  11/17/2021

## 2021-06-05 ENCOUNTER — TELEPHONE (OUTPATIENT)
Dept: FAMILY MEDICINE CLINIC | Facility: CLINIC | Age: 41
End: 2021-06-05

## 2021-06-05 LAB
ALBUMIN SERPL-MCNC: 4.2 G/DL (ref 3.8–4.8)
ALBUMIN/GLOB SERPL: 1.8 {RATIO} (ref 1.2–2.2)
ALP SERPL-CCNC: 47 IU/L (ref 48–121)
ALT SERPL-CCNC: 14 IU/L (ref 0–32)
AST SERPL-CCNC: 13 IU/L (ref 0–40)
BASOPHILS # BLD AUTO: 0 X10E3/UL (ref 0–0.2)
BASOPHILS NFR BLD AUTO: 0 %
BILIRUB SERPL-MCNC: 0.4 MG/DL (ref 0–1.2)
BUN SERPL-MCNC: 14 MG/DL (ref 6–24)
BUN/CREAT SERPL: 20 (ref 9–23)
CALCIUM SERPL-MCNC: 9.3 MG/DL (ref 8.7–10.2)
CHLORIDE SERPL-SCNC: 106 MMOL/L (ref 96–106)
CHOLEST SERPL-MCNC: 158 MG/DL (ref 100–199)
CO2 SERPL-SCNC: 23 MMOL/L (ref 20–29)
CREAT SERPL-MCNC: 0.71 MG/DL (ref 0.57–1)
EOSINOPHIL # BLD AUTO: 0 X10E3/UL (ref 0–0.4)
EOSINOPHIL NFR BLD AUTO: 0 %
ERYTHROCYTE [DISTWIDTH] IN BLOOD BY AUTOMATED COUNT: 12.4 % (ref 11.7–15.4)
GLOBULIN SER-MCNC: 2.4 G/DL (ref 1.5–4.5)
GLUCOSE SERPL-MCNC: 99 MG/DL (ref 65–99)
HCT VFR BLD AUTO: 38.8 % (ref 34–46.6)
HDLC SERPL-MCNC: 41 MG/DL
HGB BLD-MCNC: 13.2 G/DL (ref 11.1–15.9)
IMM GRANULOCYTES # BLD: 0 X10E3/UL (ref 0–0.1)
IMM GRANULOCYTES NFR BLD: 0 %
LDLC SERPL CALC-MCNC: 107 MG/DL (ref 0–99)
LDLC/HDLC SERPL: 2.6 RATIO (ref 0–3.2)
LYMPHOCYTES # BLD AUTO: 1.4 X10E3/UL (ref 0.7–3.1)
LYMPHOCYTES NFR BLD AUTO: 29 %
MCH RBC QN AUTO: 30.6 PG (ref 26.6–33)
MCHC RBC AUTO-ENTMCNC: 34 G/DL (ref 31.5–35.7)
MCV RBC AUTO: 90 FL (ref 79–97)
MONOCYTES # BLD AUTO: 0.5 X10E3/UL (ref 0.1–0.9)
MONOCYTES NFR BLD AUTO: 10 %
NEUTROPHILS # BLD AUTO: 2.9 X10E3/UL (ref 1.4–7)
NEUTROPHILS NFR BLD AUTO: 61 %
PLATELET # BLD AUTO: 191 X10E3/UL (ref 150–450)
POTASSIUM SERPL-SCNC: 4.3 MMOL/L (ref 3.5–5.2)
PROT SERPL-MCNC: 6.6 G/DL (ref 6–8.5)
RBC # BLD AUTO: 4.32 X10E6/UL (ref 3.77–5.28)
SL AMB EGFR AFRICAN AMERICAN: 122 ML/MIN/1.73
SL AMB EGFR NON AFRICAN AMERICAN: 106 ML/MIN/1.73
SL AMB VLDL CHOLESTEROL CALC: 10 MG/DL (ref 5–40)
SODIUM SERPL-SCNC: 139 MMOL/L (ref 134–144)
T4 FREE SERPL-MCNC: 1.26 NG/DL (ref 0.82–1.77)
TRIGL SERPL-MCNC: 45 MG/DL (ref 0–149)
TSH SERPL DL<=0.005 MIU/L-ACNC: 0.01 UIU/ML (ref 0.45–4.5)
WBC # BLD AUTO: 4.9 X10E3/UL (ref 3.4–10.8)

## 2021-06-05 NOTE — TELEPHONE ENCOUNTER
----- Message from Jarek Mccall MD sent at 6/5/2021  6:57 AM EDT -----  Labs good except TSH very low but T4 NL---unusual--repeat TSH/T4/T3 3mos

## 2021-06-07 ENCOUNTER — TELEPHONE (OUTPATIENT)
Dept: FAMILY MEDICINE CLINIC | Facility: CLINIC | Age: 41
End: 2021-06-07

## 2021-06-07 DIAGNOSIS — R53.83 FATIGUE, UNSPECIFIED TYPE: Primary | ICD-10-CM

## 2021-06-07 NOTE — TELEPHONE ENCOUNTER
----- Message from Lexi Early MD sent at 6/5/2021  6:57 AM EDT -----  Labs good except TSH very low but T4 NL---unusual--repeat TSH/T4/T3 3mos

## 2021-07-29 DIAGNOSIS — L30.9 DERMATITIS: Primary | ICD-10-CM

## 2021-07-29 RX ORDER — FLUOCINONIDE TOPICAL SOLUTION USP, 0.05% 0.5 MG/ML
SOLUTION TOPICAL 2 TIMES DAILY
Qty: 60 ML | Refills: 1 | Status: SHIPPED | OUTPATIENT
Start: 2021-07-29

## 2021-07-29 RX ORDER — PREDNISONE 20 MG/1
TABLET ORAL
Qty: 15 TABLET | Refills: 0 | Status: SHIPPED | OUTPATIENT
Start: 2021-07-29

## 2022-08-23 DIAGNOSIS — E55.9 VITAMIN D DEFICIENCY: ICD-10-CM

## 2022-08-23 DIAGNOSIS — M25.50 ARTHRALGIA, UNSPECIFIED JOINT: Primary | ICD-10-CM

## 2022-08-23 DIAGNOSIS — E78.2 MIXED HYPERLIPIDEMIA: ICD-10-CM

## 2022-08-23 DIAGNOSIS — R53.83 OTHER FATIGUE: ICD-10-CM

## 2022-08-26 LAB
25(OH)D3+25(OH)D2 SERPL-MCNC: 14.1 NG/ML (ref 30–100)
ALBUMIN SERPL-MCNC: 4.4 G/DL (ref 3.8–4.8)
ALBUMIN/GLOB SERPL: 1.8 {RATIO} (ref 1.2–2.2)
ALP SERPL-CCNC: 56 IU/L (ref 44–121)
ALT SERPL-CCNC: 22 IU/L (ref 0–32)
AST SERPL-CCNC: 16 IU/L (ref 0–40)
BASOPHILS # BLD AUTO: 0 X10E3/UL (ref 0–0.2)
BASOPHILS NFR BLD AUTO: 0 %
BILIRUB SERPL-MCNC: 0.8 MG/DL (ref 0–1.2)
BUN SERPL-MCNC: 18 MG/DL (ref 6–24)
BUN/CREAT SERPL: 23 (ref 9–23)
CALCIUM SERPL-MCNC: 9.2 MG/DL (ref 8.7–10.2)
CHLORIDE SERPL-SCNC: 104 MMOL/L (ref 96–106)
CHOLEST SERPL-MCNC: 175 MG/DL (ref 100–199)
CO2 SERPL-SCNC: 22 MMOL/L (ref 20–29)
CREAT SERPL-MCNC: 0.78 MG/DL (ref 0.57–1)
CRP SERPL-MCNC: 3 MG/L (ref 0–10)
EGFR: 97 ML/MIN/1.73
EOSINOPHIL # BLD AUTO: 0 X10E3/UL (ref 0–0.4)
EOSINOPHIL NFR BLD AUTO: 0 %
ERYTHROCYTE [DISTWIDTH] IN BLOOD BY AUTOMATED COUNT: 12.2 % (ref 11.7–15.4)
GLOBULIN SER-MCNC: 2.5 G/DL (ref 1.5–4.5)
GLUCOSE SERPL-MCNC: 96 MG/DL (ref 65–99)
HCT VFR BLD AUTO: 40.9 % (ref 34–46.6)
HDLC SERPL-MCNC: 44 MG/DL
HGB BLD-MCNC: 14.1 G/DL (ref 11.1–15.9)
IMM GRANULOCYTES # BLD: 0 X10E3/UL (ref 0–0.1)
IMM GRANULOCYTES NFR BLD: 0 %
LDLC SERPL CALC-MCNC: 123 MG/DL (ref 0–99)
LDLC/HDLC SERPL: 2.8 RATIO (ref 0–3.2)
LYMPHOCYTES # BLD AUTO: 1.8 X10E3/UL (ref 0.7–3.1)
LYMPHOCYTES NFR BLD AUTO: 30 %
MCH RBC QN AUTO: 30.9 PG (ref 26.6–33)
MCHC RBC AUTO-ENTMCNC: 34.5 G/DL (ref 31.5–35.7)
MCV RBC AUTO: 90 FL (ref 79–97)
MONOCYTES # BLD AUTO: 0.7 X10E3/UL (ref 0.1–0.9)
MONOCYTES NFR BLD AUTO: 11 %
NEUTROPHILS # BLD AUTO: 3.5 X10E3/UL (ref 1.4–7)
NEUTROPHILS NFR BLD AUTO: 59 %
PLATELET # BLD AUTO: 232 X10E3/UL (ref 150–450)
POTASSIUM SERPL-SCNC: 4.3 MMOL/L (ref 3.5–5.2)
PROT SERPL-MCNC: 6.9 G/DL (ref 6–8.5)
RBC # BLD AUTO: 4.56 X10E6/UL (ref 3.77–5.28)
SL AMB VLDL CHOLESTEROL CALC: 8 MG/DL (ref 5–40)
SODIUM SERPL-SCNC: 137 MMOL/L (ref 134–144)
T4 FREE SERPL-MCNC: 1.36 NG/DL (ref 0.82–1.77)
TRIGL SERPL-MCNC: 40 MG/DL (ref 0–149)
TSH SERPL DL<=0.005 MIU/L-ACNC: 0.01 UIU/ML (ref 0.45–4.5)
WBC # BLD AUTO: 6 X10E3/UL (ref 3.4–10.8)

## 2022-09-08 DIAGNOSIS — E04.1 THYROID NODULE: Primary | ICD-10-CM

## 2022-09-12 ENCOUNTER — HOSPITAL ENCOUNTER (OUTPATIENT)
Dept: ULTRASOUND IMAGING | Facility: HOSPITAL | Age: 42
Discharge: HOME | End: 2022-09-12
Payer: COMMERCIAL

## 2022-09-12 DIAGNOSIS — E04.1 THYROID NODULE: ICD-10-CM

## 2022-09-12 PROCEDURE — 76536 US EXAM OF HEAD AND NECK: CPT

## 2022-09-15 DIAGNOSIS — E04.1 THYROID NODULE: Primary | ICD-10-CM

## 2022-09-22 PROBLEM — K21.9 LARYNGOPHARYNGEAL REFLUX (LPR): Status: ACTIVE | Noted: 2022-09-22

## 2022-09-22 PROBLEM — K21.9 LARYNGOPHARYNGEAL REFLUX (LPR): Chronic | Status: ACTIVE | Noted: 2022-09-22

## 2022-09-22 PROBLEM — E04.1 THYROID NODULE: Status: ACTIVE | Noted: 2022-09-22

## 2022-10-04 ENCOUNTER — HOSPITAL ENCOUNTER (OUTPATIENT)
Dept: ULTRASOUND IMAGING | Facility: HOSPITAL | Age: 42
Discharge: HOME | End: 2022-10-04
Payer: COMMERCIAL

## 2022-10-04 DIAGNOSIS — E04.1 THYROID NODULE: ICD-10-CM

## 2022-10-04 PROCEDURE — 88173 CYTOPATH EVAL FNA REPORT: CPT | Performed by: PATHOLOGY

## 2022-10-04 PROCEDURE — 88172 CYTP DX EVAL FNA 1ST EA SITE: CPT | Performed by: PATHOLOGY

## 2022-10-04 PROCEDURE — 10005 FNA BX W/US GDN 1ST LES: CPT

## 2022-10-04 RX ORDER — LIDOCAINE HYDROCHLORIDE 10 MG/ML
10 INJECTION, SOLUTION EPIDURAL; INFILTRATION; INTRACAUDAL; PERINEURAL ONCE
Status: COMPLETED | OUTPATIENT
Start: 2022-10-04 | End: 2022-10-04

## 2022-10-04 RX ADMIN — LIDOCAINE HYDROCHLORIDE 10 ML: 10 INJECTION, SOLUTION EPIDURAL; INFILTRATION; INTRACAUDAL; PERINEURAL at 15:11

## 2022-10-06 PROCEDURE — 88172 CYTP DX EVAL FNA 1ST EA SITE: CPT | Performed by: PATHOLOGY

## 2022-10-06 PROCEDURE — 88173 CYTOPATH EVAL FNA REPORT: CPT | Performed by: PATHOLOGY

## 2023-01-31 DIAGNOSIS — R52 PAIN: Primary | ICD-10-CM

## 2023-01-31 RX ORDER — PREDNISONE 20 MG/1
TABLET ORAL
Qty: 15 TABLET | Refills: 1 | Status: SHIPPED | OUTPATIENT
Start: 2023-01-31

## 2023-02-21 ENCOUNTER — OFFICE VISIT (OUTPATIENT)
Dept: FAMILY MEDICINE CLINIC | Facility: CLINIC | Age: 43
End: 2023-02-21

## 2023-02-21 VITALS
HEART RATE: 68 BPM | OXYGEN SATURATION: 98 % | RESPIRATION RATE: 16 BRPM | DIASTOLIC BLOOD PRESSURE: 68 MMHG | WEIGHT: 176 LBS | BODY MASS INDEX: 27.62 KG/M2 | HEIGHT: 67 IN | SYSTOLIC BLOOD PRESSURE: 118 MMHG

## 2023-02-21 DIAGNOSIS — Z00.00 WELLNESS EXAMINATION: ICD-10-CM

## 2023-02-21 DIAGNOSIS — Z12.31 ENCOUNTER FOR SCREENING MAMMOGRAM FOR BREAST CANCER: Primary | ICD-10-CM

## 2023-02-21 DIAGNOSIS — E55.9 VITAMIN D DEFICIENCY: ICD-10-CM

## 2023-02-21 DIAGNOSIS — R53.83 FATIGUE, UNSPECIFIED TYPE: ICD-10-CM

## 2023-02-21 NOTE — PROGRESS NOTES
HPI:  Marlys Ramos is a 36 y o  female here for her yearly health maintenance exam    Patient Active Problem List   Diagnosis   • Thyroid nodule   • Laryngopharyngeal reflux (LPR)     History reviewed  No pertinent past medical history  1  Advanced Directive: n     2  Durable Power of  for Healthcare: n     3  Social History:           Drug and alcohol History: n                  4  Immunizations up to date: y                 Lifestyle:                           Healthy Diet:y                          Alcohol Use:n                          Tobacco Use:n                          Regular exercise:y                          Weight concerns:n                               5  Over the past 2 weeks, how often have you been bothered by the following:              Little interest or pleasure in doing things:n              Felling down, depressed or hopeless:n       Current Outpatient Medications   Medication Sig Dispense Refill   • ketorolac (TORADOL) 10 mg tablet Take 1 tablet (10 mg total) by mouth every 6 (six) hours as needed for moderate pain 30 tablet 3   • levonorgestrel (MIRENA) 20 MCG/24HR IUD 1 Intra Uterine Device by Intrauterine route once for 1 dose Pt will bring to her appt 1 each 0     No current facility-administered medications for this visit  Allergies   Allergen Reactions   • Chlorhexidine Hives, Itching and Swelling     Immunization History   Administered Date(s) Administered   • Influenza Quadrivalent Preservative Free 3 years and older IM 10/10/2016   • Tdap 08/29/2014       Patient Care Team:  Martina Arenas MD as PCP - General (Family Medicine)    Review of Systems   Constitutional: Negative for fatigue, fever and unexpected weight change  HENT: Negative for congestion, sinus pain and sore throat  Eyes: Negative for visual disturbance  Respiratory: Negative for shortness of breath and wheezing  Cardiovascular: Negative for chest pain and palpitations     Gastrointestinal: Negative for abdominal pain, nausea and vomiting  Musculoskeletal: Negative  Negative for arthralgias and myalgias  Neurological: Negative for syncope, weakness and numbness  Psychiatric/Behavioral: Negative  Negative for confusion, dysphoric mood and suicidal ideas  Physical Exam :  Physical Exam  Constitutional:       Appearance: She is well-developed  HENT:      Right Ear: Ear canal normal  Tympanic membrane is not injected  Left Ear: Ear canal normal  Tympanic membrane is not injected  Nose: Nose normal    Eyes:      General:         Right eye: No discharge  Left eye: No discharge  Conjunctiva/sclera: Conjunctivae normal       Pupils: Pupils are equal, round, and reactive to light  Neck:      Thyroid: No thyromegaly  Cardiovascular:      Rate and Rhythm: Normal rate and regular rhythm  Heart sounds: Normal heart sounds  No murmur heard  Pulmonary:      Effort: Pulmonary effort is normal  No respiratory distress  Breath sounds: Normal breath sounds  No wheezing  Abdominal:      General: Bowel sounds are normal  There is no distension  Palpations: Abdomen is soft  Tenderness: There is no abdominal tenderness  Musculoskeletal:         General: Normal range of motion  Cervical back: Normal range of motion and neck supple  Lymphadenopathy:      Cervical: No cervical adenopathy  Skin:     General: Skin is warm and dry  Neurological:      Mental Status: She is alert and oriented to person, place, and time  She is not disoriented  Sensory: No sensory deficit  Gait: Gait normal       Deep Tendon Reflexes: Reflexes are normal and symmetric  Psychiatric:         Speech: Speech normal          Behavior: Behavior normal          Thought Content: Thought content normal          Judgment: Judgment normal            Assessment and Plan:  1  Encounter for screening mammogram for breast cancer  Mammo screening bilateral w 3d & cad      2  Vitamin D deficiency  Vitamin D 25 hydroxy    Vitamin D 25 hydroxy      3  Fatigue, unspecified type  Vitamin D 25 hydroxy    TSH, 3rd generation    T4, free    Vitamin D 25 hydroxy    TSH, 3rd generation    T4, free      4   Wellness examination            Health Maintenance Due   Topic Date Due   • Hepatitis C Screening  Never done   • COVID-19 Vaccine (1) Never done   • HIV Screening  Never done   • BMI: Followup Plan  Never done   • Breast Cancer Screening: Mammogram  11/17/2021   • Influenza Vaccine (1) 09/01/2022

## 2023-03-03 LAB
25(OH)D3+25(OH)D2 SERPL-MCNC: 36.8 NG/ML (ref 30–100)
T4 FREE SERPL-MCNC: 1.4 NG/DL (ref 0.82–1.77)
TSH SERPL DL<=0.005 MIU/L-ACNC: 0.01 UIU/ML (ref 0.45–4.5)

## 2023-03-06 ENCOUNTER — TELEPHONE (OUTPATIENT)
Dept: FAMILY MEDICINE CLINIC | Facility: CLINIC | Age: 43
End: 2023-03-06

## 2023-03-06 NOTE — TELEPHONE ENCOUNTER
Pt aware    ----- Message from Salima Briones MD sent at 3/4/2023  7:41 AM EST -----  Vit D not bad---TSH low but circulating T4 good--repeat yearly  ----- Message -----  From: [facility] Results In  Sent: 3/3/2023   8:07 AM EST  To: Salima Briones MD

## 2023-09-26 ENCOUNTER — HOSPITAL ENCOUNTER (OUTPATIENT)
Dept: ULTRASOUND IMAGING | Facility: HOSPITAL | Age: 43
Discharge: HOME | End: 2023-09-26
Payer: COMMERCIAL

## 2023-09-26 DIAGNOSIS — E04.1 THYROID NODULE: ICD-10-CM

## 2023-09-26 PROCEDURE — 76536 US EXAM OF HEAD AND NECK: CPT

## 2024-03-27 ENCOUNTER — HOSPITAL ENCOUNTER (OUTPATIENT)
Dept: MAMMOGRAPHY | Facility: IMAGING CENTER | Age: 44
Discharge: HOME | End: 2024-03-27
Payer: COMMERCIAL

## 2024-03-27 VITALS — BODY MASS INDEX: 28.56 KG/M2 | WEIGHT: 182 LBS | HEIGHT: 67 IN

## 2024-03-27 DIAGNOSIS — Z12.31 ENCOUNTER FOR SCREENING MAMMOGRAM FOR BREAST CANCER: ICD-10-CM

## 2024-03-27 PROCEDURE — 77067 SCR MAMMO BI INCL CAD: CPT

## 2024-03-27 PROCEDURE — 77063 BREAST TOMOSYNTHESIS BI: CPT

## 2024-04-10 ENCOUNTER — OFFICE VISIT (OUTPATIENT)
Dept: GYNECOLOGY | Facility: CLINIC | Age: 44
End: 2024-04-10
Payer: COMMERCIAL

## 2024-04-10 VITALS
HEIGHT: 66 IN | WEIGHT: 186.8 LBS | DIASTOLIC BLOOD PRESSURE: 74 MMHG | SYSTOLIC BLOOD PRESSURE: 114 MMHG | BODY MASS INDEX: 30.02 KG/M2

## 2024-04-10 DIAGNOSIS — E04.1 THYROID NODULE: ICD-10-CM

## 2024-04-10 DIAGNOSIS — Z97.5 IUD (INTRAUTERINE DEVICE) IN PLACE: ICD-10-CM

## 2024-04-10 DIAGNOSIS — Z11.51 SCREENING FOR HUMAN PAPILLOMAVIRUS (HPV): ICD-10-CM

## 2024-04-10 DIAGNOSIS — Z01.419 WOMEN'S ANNUAL ROUTINE GYNECOLOGICAL EXAMINATION: Primary | ICD-10-CM

## 2024-04-10 DIAGNOSIS — N91.1 SECONDARY AMENORRHEA: ICD-10-CM

## 2024-04-10 DIAGNOSIS — Z12.31 ENCOUNTER FOR SCREENING MAMMOGRAM FOR BREAST CANCER: ICD-10-CM

## 2024-04-10 DIAGNOSIS — R92.333 HETEROGENEOUSLY DENSE TISSUE OF BOTH BREASTS ON MAMMOGRAPHY: ICD-10-CM

## 2024-04-10 PROBLEM — R92.30 DENSE BREAST TISSUE ON MAMMOGRAM: Status: ACTIVE | Noted: 2024-04-10

## 2024-04-10 PROCEDURE — G0145 SCR C/V CYTO,THINLAYER,RESCR: HCPCS | Performed by: OBSTETRICS & GYNECOLOGY

## 2024-04-10 PROCEDURE — G0476 HPV COMBO ASSAY CA SCREEN: HCPCS | Performed by: OBSTETRICS & GYNECOLOGY

## 2024-04-10 PROCEDURE — S0610 ANNUAL GYNECOLOGICAL EXAMINA: HCPCS | Performed by: OBSTETRICS & GYNECOLOGY

## 2024-04-10 NOTE — PROGRESS NOTES
Assessment/Plan   Diagnoses and all orders for this visit:    Women's annual routine gynecological examination    Encounter for screening mammogram for breast cancer  -     Mammo screening bilateral w 3d & cad; Future    Heterogeneously dense tissue of both breasts on mammography    IUD (intrauterine device) in place  -     US pelvis complete non OB; Future    Thyroid nodule    Secondary amenorrhea  -     US pelvis complete non OB; Future    1. yearly exam-Pap smear done with HPV testing, self breast awareness reviewed, calcium/vitamin D recommendations discussed, mammogram request given, colonoscopy to be recommended after age 45  2. Mirena IUD-was placed with removal with reinsertion on 2020 by Dr. Lucia.  Had prior Mirena IUD in  which was removed at the 5-year tesha.  No IUD string could be seen today.  Patient was last seen at the time of insertion.  She has secondary amenorrhea, highly suggestive that the IUD is in good position.  Would recommend ultrasound to confirm this and she will get this done in the near future.  Counseled her to use other contraception until IUD positioning is confirmed.  3.  Secondary amenorrhea-likely related to Mirena IUD.  To check IUD positioning as noted above.  Did have history of heavy menses in the past, entirely resolved with Mirena IUD.  4. history of thyroid nodule-most recent imaging with 1 cm nodule to the right, 4 cm nodule to the left.  Exam today, 3 cm nodule was appreciated to the left.  Follow-up as per ENT  That she saw recently.  5. dense breast changes-noted on most recent mammogram from 3/27/2024.  Counseled about limited visualization and possible increased risk related to dense changes.  Offered ABUS, she will defer at this time.  6. history of  x 4 with 1 twin  at 34.3 weeks.  Other deliveries at 36.1, 40, 38.6 weeks, all via .  7. 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  Follow-up 1 year for yearly exam or as needed.    Subjective   Patient ID: Kaylen Aldana is a 43 y.o. female.    Vitals:    04/10/24 1458   BP: 114/74     HPI    The following portions of the patient's history were reviewed and updated as appropriate: allergies, current medications, past family history, past medical history, past social history, past surgical history, and problem list.  History reviewed. No pertinent past medical history.  Past Surgical History:   Procedure Laterality Date    ABDOMINAL SURGERY      APPENDECTOMY       SECTION      US GUIDED THYROID BIOPSY  10/4/2022    WISDOM TOOTH EXTRACTION       OB History    Para Term  AB Living   7 7 5 2   5   SAB IAB Ectopic Multiple Live Births           5      # Outcome Date GA Lbr Dimitris/2nd Weight Sex Delivery Anes PTL Lv   7 Term            6 Term            5 Term            4 Term            3 Term            2             1                 Current Outpatient Medications:     ketorolac (TORADOL) 10 mg tablet, Take 1 tablet (10 mg total) by mouth every 6 (six) hours as needed for moderate pain, Disp: 30 tablet, Rfl: 3    levonorgestrel (MIRENA) 20 MCG/24HR IUD, 1 Intra Uterine Device by Intrauterine route once for 1 dose Pt will bring to her appt, Disp: 1 each, Rfl: 0  Allergies   Allergen Reactions    Chlorhexidine Hives, Itching and Swelling     Social History     Socioeconomic History    Marital status: /Civil Union     Spouse name: None    Number of children: None    Years of education: None    Highest education level: None   Occupational History    None   Tobacco Use    Smoking status: Never    Smokeless tobacco: Never   Vaping Use    Vaping status: Never Used   Substance and Sexual Activity    Alcohol use: Yes    Drug use: No    Sexual activity: Yes     Partners: Female     Birth control/protection: I.U.D.   Other Topics Concern    None   Social History Narrative    None     Social Determinants of  "Health     Financial Resource Strain: Not on file   Food Insecurity: Not on file   Transportation Needs: Not on file   Physical Activity: Not on file   Stress: Not on file   Social Connections: Not on file   Intimate Partner Violence: Not on file   Housing Stability: Not on file     Family History   Problem Relation Age of Onset    Hypertension Mother     Hyperlipidemia Mother     Hypertension Father     Hyperlipidemia Father     Arthritis Father     No Known Problems Daughter     No Known Problems Daughter     No Known Problems Daughter     Breast cancer Maternal Grandmother         age unknown    No Known Problems Maternal Grandfather     No Known Problems Paternal Grandmother     No Known Problems Paternal Grandfather     No Known Problems Maternal Aunt     No Known Problems Maternal Aunt     No Known Problems Paternal Aunt     No Known Problems Paternal Aunt     No Known Problems Paternal Aunt     No Known Problems Paternal Aunt        Review of Systems    Objective   Physical Exam  OBGyn Exam     Objective      /74 (BP Location: Right arm, Patient Position: Sitting)   Ht 5' 6\" (1.676 m)   Wt 84.7 kg (186 lb 12.8 oz)   BMI 30.15 kg/m²     General:   alert and oriented, in no acute distress   Neck: Approximately 3 cm nodule noted left thyroid.  Right was nonpalpable   Breast: normal appearance, no masses or tenderness   Heart:    Lungs:    Abdomen: soft, non-tender, without masses or organomegaly   Vulva: normal   Vagina: Without erythema or lesions or discharge.  Normal   Cervix: Without lesions or discharge or cervicitis.  No Cervical motion tenderness.  IUD string was not visualized   Uterus: top normal size, mid-position, non-tender   Adnexa: no mass, fullness, tenderness   Rectum: negative    Psych:  Normal mood and affect   Skin:  Without obvious lesions   Eyes: symmetric, with normal movements and reactivity   Musculoskeletal:  Normal muscle tone and movements appreciated       "

## 2024-04-18 LAB
LAB AP GYN PRIMARY INTERPRETATION: NORMAL
Lab: NORMAL

## 2024-05-01 DIAGNOSIS — E78.2 MIXED HYPERLIPIDEMIA: Primary | ICD-10-CM

## 2024-05-01 DIAGNOSIS — R53.83 OTHER FATIGUE: ICD-10-CM

## 2024-05-02 ENCOUNTER — APPOINTMENT (OUTPATIENT)
Dept: LAB | Facility: HOSPITAL | Age: 44
End: 2024-05-02
Payer: COMMERCIAL

## 2024-05-02 DIAGNOSIS — E04.1 THYROID NODULE: ICD-10-CM

## 2024-05-02 DIAGNOSIS — R53.83 OTHER FATIGUE: ICD-10-CM

## 2024-05-02 DIAGNOSIS — E78.2 MIXED HYPERLIPIDEMIA: ICD-10-CM

## 2024-05-02 LAB
ALBUMIN SERPL BCP-MCNC: 4 G/DL (ref 3.5–5)
ALP SERPL-CCNC: 44 U/L (ref 34–104)
ALT SERPL W P-5'-P-CCNC: 31 U/L (ref 7–52)
ANION GAP SERPL CALCULATED.3IONS-SCNC: 7 MMOL/L (ref 4–13)
AST SERPL W P-5'-P-CCNC: 17 U/L (ref 13–39)
BASOPHILS # BLD AUTO: 0.02 THOUSANDS/ÂΜL (ref 0–0.1)
BASOPHILS NFR BLD AUTO: 0 % (ref 0–1)
BILIRUB SERPL-MCNC: 0.63 MG/DL (ref 0.2–1)
BUN SERPL-MCNC: 10 MG/DL (ref 5–25)
CALCIUM SERPL-MCNC: 8.6 MG/DL (ref 8.4–10.2)
CHLORIDE SERPL-SCNC: 107 MMOL/L (ref 96–108)
CHOLEST SERPL-MCNC: 158 MG/DL
CO2 SERPL-SCNC: 26 MMOL/L (ref 21–32)
CREAT SERPL-MCNC: 0.67 MG/DL (ref 0.6–1.3)
CRP SERPL QL: 3.6 MG/L
EOSINOPHIL # BLD AUTO: 0 THOUSAND/ÂΜL (ref 0–0.61)
EOSINOPHIL NFR BLD AUTO: 0 % (ref 0–6)
ERYTHROCYTE [DISTWIDTH] IN BLOOD BY AUTOMATED COUNT: 12 % (ref 11.6–15.1)
GFR SERPL CREATININE-BSD FRML MDRD: 107 ML/MIN/1.73SQ M
GLUCOSE P FAST SERPL-MCNC: 100 MG/DL (ref 65–99)
HCT VFR BLD AUTO: 39.8 % (ref 34.8–46.1)
HDLC SERPL-MCNC: 44 MG/DL
HGB BLD-MCNC: 13.5 G/DL (ref 11.5–15.4)
IMM GRANULOCYTES # BLD AUTO: 0.02 THOUSAND/UL (ref 0–0.2)
IMM GRANULOCYTES NFR BLD AUTO: 0 % (ref 0–2)
LDLC SERPL CALC-MCNC: 106 MG/DL (ref 0–100)
LYMPHOCYTES # BLD AUTO: 1.86 THOUSANDS/ÂΜL (ref 0.6–4.47)
LYMPHOCYTES NFR BLD AUTO: 30 % (ref 14–44)
MCH RBC QN AUTO: 31 PG (ref 26.8–34.3)
MCHC RBC AUTO-ENTMCNC: 33.9 G/DL (ref 31.4–37.4)
MCV RBC AUTO: 91 FL (ref 82–98)
MONOCYTES # BLD AUTO: 0.65 THOUSAND/ÂΜL (ref 0.17–1.22)
MONOCYTES NFR BLD AUTO: 11 % (ref 4–12)
NEUTROPHILS # BLD AUTO: 3.61 THOUSANDS/ÂΜL (ref 1.85–7.62)
NEUTS SEG NFR BLD AUTO: 59 % (ref 43–75)
NRBC BLD AUTO-RTO: 0 /100 WBCS
PLATELET # BLD AUTO: 218 THOUSANDS/UL (ref 149–390)
PMV BLD AUTO: 11.6 FL (ref 8.9–12.7)
POTASSIUM SERPL-SCNC: 4 MMOL/L (ref 3.5–5.3)
PROT SERPL-MCNC: 6.4 G/DL (ref 6.4–8.4)
RBC # BLD AUTO: 4.36 MILLION/UL (ref 3.81–5.12)
SODIUM SERPL-SCNC: 140 MMOL/L (ref 135–147)
T4 FREE SERPL-MCNC: 0.87 NG/DL (ref 0.61–1.12)
TRIGL SERPL-MCNC: 41 MG/DL
TSH SERPL DL<=0.05 MIU/L-ACNC: <0.01 UIU/ML (ref 0.45–4.5)
WBC # BLD AUTO: 6.16 THOUSAND/UL (ref 4.31–10.16)

## 2024-05-02 PROCEDURE — 86140 C-REACTIVE PROTEIN: CPT

## 2024-05-02 PROCEDURE — 36415 COLL VENOUS BLD VENIPUNCTURE: CPT

## 2024-05-02 PROCEDURE — 84443 ASSAY THYROID STIM HORMONE: CPT

## 2024-05-02 PROCEDURE — 80061 LIPID PANEL: CPT

## 2024-05-02 PROCEDURE — 84439 ASSAY OF FREE THYROXINE: CPT

## 2024-05-02 PROCEDURE — 85025 COMPLETE CBC W/AUTO DIFF WBC: CPT

## 2024-05-02 PROCEDURE — 80053 COMPREHEN METABOLIC PANEL: CPT

## 2024-05-03 DIAGNOSIS — E04.1 THYROID NODULE: Primary | ICD-10-CM

## 2024-05-06 ENCOUNTER — OFFICE VISIT (OUTPATIENT)
Dept: FAMILY MEDICINE CLINIC | Facility: CLINIC | Age: 44
End: 2024-05-06
Payer: COMMERCIAL

## 2024-05-06 VITALS
DIASTOLIC BLOOD PRESSURE: 68 MMHG | SYSTOLIC BLOOD PRESSURE: 124 MMHG | TEMPERATURE: 97.3 F | OXYGEN SATURATION: 99 % | BODY MASS INDEX: 30.37 KG/M2 | WEIGHT: 189 LBS | HEIGHT: 66 IN

## 2024-05-06 DIAGNOSIS — E04.1 THYROID NODULE: Primary | ICD-10-CM

## 2024-05-06 DIAGNOSIS — L40.9 PSORIASIS: ICD-10-CM

## 2024-05-06 DIAGNOSIS — Z00.00 WELLNESS EXAMINATION: ICD-10-CM

## 2024-05-06 PROCEDURE — 99396 PREV VISIT EST AGE 40-64: CPT | Performed by: FAMILY MEDICINE

## 2024-05-06 RX ORDER — PREDNISONE 20 MG/1
TABLET ORAL
Qty: 15 TABLET | Refills: 0 | Status: SHIPPED | OUTPATIENT
Start: 2024-05-06

## 2024-05-06 NOTE — PROGRESS NOTES
HPI:  Kaylen Aldana is a 43 y.o. female here for her yearly health maintenance exam.   Patient Active Problem List   Diagnosis    Thyroid nodule    Laryngopharyngeal reflux (LPR)    Dense breast tissue on mammogram    IUD (intrauterine device) in place    Secondary amenorrhea    Psoriasis     History reviewed. No pertinent past medical history.    1. Advanced Directive: n     2. Durable Power of  for Healthcare: n     3. Social History:           Drug and alcohol History: n                  4. Immunizations up to date: y                 Lifestyle:                           Healthy Diet:y                          Alcohol Use:n                          Tobacco Use:n                          Regular exercise:y                          Weight concerns:n                               5. Over the past 2 weeks, how often have you been bothered by the following:              Little interest or pleasure in doing things:n              Felling down, depressed or hopeless:n       Current Outpatient Medications   Medication Sig Dispense Refill    ketorolac (TORADOL) 10 mg tablet Take 1 tablet (10 mg total) by mouth every 6 (six) hours as needed for moderate pain 30 tablet 3    predniSONE 20 mg tablet TAKE 1 TABLET BID X 5 DAYS THEN TAKE 1 TABLET QD FOR 5 DAYS 15 tablet 0    levonorgestrel (MIRENA) 20 MCG/24HR IUD 1 Intra Uterine Device by Intrauterine route once for 1 dose Pt will bring to her appt 1 each 0     No current facility-administered medications for this visit.     Allergies   Allergen Reactions    Chlorhexidine Hives, Itching and Swelling     Immunization History   Administered Date(s) Administered    COVID-19 PFIZER VACCINE 0.3 ML IM 12/23/2020, 01/12/2021, 10/20/2021    Influenza Quadrivalent Preservative Free 3 years and older IM 10/10/2016    Tdap 08/29/2014       Patient Care Team:  Adalberto Freitas MD as PCP - General (Family Medicine)    Review of Systems   Constitutional:  Negative for fatigue, fever  and unexpected weight change.   HENT:  Negative for congestion, sinus pain and sore throat.    Eyes:  Negative for visual disturbance.   Respiratory:  Negative for shortness of breath and wheezing.    Cardiovascular:  Negative for chest pain and palpitations.   Gastrointestinal:  Negative for abdominal pain, nausea and vomiting.   Musculoskeletal: Negative.  Negative for arthralgias and myalgias.   Neurological:  Negative for syncope, weakness and numbness.   Psychiatric/Behavioral: Negative.  Negative for confusion, dysphoric mood and suicidal ideas.        Physical Exam :  Physical Exam  Constitutional:       Appearance: She is well-developed.   HENT:      Right Ear: Ear canal normal. Tympanic membrane is not injected.      Left Ear: Ear canal normal. Tympanic membrane is not injected.      Nose: Nose normal.   Eyes:      General:         Right eye: No discharge.         Left eye: No discharge.      Conjunctiva/sclera: Conjunctivae normal.      Pupils: Pupils are equal, round, and reactive to light.   Neck:      Thyroid: No thyromegaly.   Cardiovascular:      Rate and Rhythm: Normal rate and regular rhythm.      Heart sounds: Normal heart sounds. No murmur heard.  Pulmonary:      Effort: Pulmonary effort is normal. No respiratory distress.      Breath sounds: Normal breath sounds. No wheezing.   Abdominal:      General: Bowel sounds are normal. There is no distension.      Palpations: Abdomen is soft.      Tenderness: There is no abdominal tenderness.   Musculoskeletal:         General: Normal range of motion.      Cervical back: Normal range of motion and neck supple.   Lymphadenopathy:      Cervical: No cervical adenopathy.   Skin:     General: Skin is warm and dry.   Neurological:      Mental Status: She is alert and oriented to person, place, and time. She is not disoriented.      Sensory: No sensory deficit.      Motor: No weakness.      Coordination: Coordination normal.      Gait: Gait normal.      Deep  Tendon Reflexes: Reflexes are normal and symmetric.   Psychiatric:         Speech: Speech normal.         Behavior: Behavior normal.         Thought Content: Thought content normal.         Judgment: Judgment normal.           Assessment and Plan:  1. Thyroid nodule        2. Wellness examination        3. Psoriasis  predniSONE 20 mg tablet          Health Maintenance Due   Topic Date Due    Hepatitis C Screening  Never done    HIV Screening  Never done    COVID-19 Vaccine (4 - 2023-24 season) 09/01/2023

## 2024-05-22 ENCOUNTER — HOSPITAL ENCOUNTER (OUTPATIENT)
Dept: ULTRASOUND IMAGING | Facility: HOSPITAL | Age: 44
Discharge: HOME | End: 2024-05-22
Payer: COMMERCIAL

## 2024-05-22 DIAGNOSIS — Z97.5 IUD (INTRAUTERINE DEVICE) IN PLACE: ICD-10-CM

## 2024-05-22 DIAGNOSIS — N91.1 SECONDARY AMENORRHEA: ICD-10-CM

## 2024-05-22 PROCEDURE — 76856 US EXAM PELVIC COMPLETE: CPT

## 2024-05-22 PROCEDURE — 76830 TRANSVAGINAL US NON-OB: CPT

## 2024-06-18 DIAGNOSIS — Z00.6 ENCOUNTER FOR EXAMINATION FOR NORMAL COMPARISON OR CONTROL IN CLINICAL RESEARCH PROGRAM: ICD-10-CM

## 2024-07-02 ENCOUNTER — APPOINTMENT (OUTPATIENT)
Dept: LAB | Facility: HOSPITAL | Age: 44
End: 2024-07-02

## 2024-07-02 DIAGNOSIS — Z00.6 ENCOUNTER FOR EXAMINATION FOR NORMAL COMPARISON OR CONTROL IN CLINICAL RESEARCH PROGRAM: ICD-10-CM

## 2024-07-02 PROCEDURE — 36415 COLL VENOUS BLD VENIPUNCTURE: CPT

## 2024-07-15 LAB
APOB+LDLR+PCSK9 GENE MUT ANL BLD/T: NOT DETECTED
BRCA1+BRCA2 DEL+DUP + FULL MUT ANL BLD/T: NOT DETECTED
MLH1+MSH2+MSH6+PMS2 GN DEL+DUP+FUL M: NOT DETECTED

## 2024-07-16 ENCOUNTER — OFFICE VISIT (OUTPATIENT)
Dept: FAMILY MEDICINE CLINIC | Facility: CLINIC | Age: 44
End: 2024-07-16
Payer: COMMERCIAL

## 2024-07-16 VITALS
OXYGEN SATURATION: 97 % | WEIGHT: 191 LBS | TEMPERATURE: 98.1 F | DIASTOLIC BLOOD PRESSURE: 78 MMHG | BODY MASS INDEX: 30.83 KG/M2 | SYSTOLIC BLOOD PRESSURE: 110 MMHG | HEART RATE: 69 BPM

## 2024-07-16 DIAGNOSIS — T63.441A BEE STING REACTION, ACCIDENTAL OR UNINTENTIONAL, INITIAL ENCOUNTER: Primary | ICD-10-CM

## 2024-07-16 PROCEDURE — 99213 OFFICE O/P EST LOW 20 MIN: CPT | Performed by: NURSE PRACTITIONER

## 2024-07-16 RX ORDER — METHYLPREDNISOLONE 4 MG/1
TABLET ORAL
Qty: 21 EACH | Refills: 0 | Status: SHIPPED | OUTPATIENT
Start: 2024-07-16

## 2024-07-16 NOTE — ASSESSMENT & PLAN NOTE
Apply ice to reduce swelling. Medrol dose pack as ordered. Take a daily antihistamine. Call if symptoms do not improve or worsen.

## 2024-07-16 NOTE — PROGRESS NOTES
Ambulatory Visit  Name: Kaylen Aldana      : 1980      MRN: [id number]  Encounter Provider: HIMANSHU Vivas  Encounter Date: 2024   Encounter department: [facility]    Assessment & Plan   1. Bee sting reaction, accidental or unintentional, initial encounter  Assessment & Plan:  Apply ice to reduce swelling. Medrol dose pack as ordered. Take a daily antihistamine. Call if symptoms do not improve or worsen.  Orders:  -     methylPREDNISolone 4 MG tablet therapy pack; Use as directed on package       History of Present Illness     HPI    Review of Systems   Constitutional:  Negative for activity change and fever.   HENT:  Positive for facial swelling (s/p bee sting right face).    Eyes:  Negative for visual disturbance.   Respiratory:  Negative for chest tightness, shortness of breath and wheezing.    Cardiovascular:  Negative for chest pain.   Neurological:  Negative for headaches.   Psychiatric/Behavioral:  Negative for dysphoric mood.        Objective     /78 (BP Location: Left arm, Patient Position: Sitting, Cuff Size: Standard)   Pulse 69   Temp 98.1 °F (36.7 °C) (Tympanic)   Wt 86.6 kg (191 lb)   SpO2 97%   BMI 30.83 kg/m²     Physical Exam  Vitals and nursing note reviewed.   Constitutional:       General: She is not in acute distress.     Appearance: Normal appearance. She is not ill-appearing or diaphoretic.   HENT:      Head: Normocephalic.   Eyes:      Extraocular Movements: Extraocular movements intact.   Cardiovascular:      Rate and Rhythm: Normal rate and regular rhythm.      Heart sounds: Normal heart sounds.   Pulmonary:      Effort: Pulmonary effort is normal. No respiratory distress.      Breath sounds: Normal breath sounds.   Skin:     General: Skin is warm and dry.      Comments: Positive facial swelling below right eye and right cheek due to bee sting. No infection noted.   Neurological:      Mental Status: She is alert and  oriented to person, place, and time.   Psychiatric:         Mood and Affect: Mood normal.         Behavior: Behavior normal.

## 2024-09-08 DIAGNOSIS — M35.9 AUTOIMMUNE DISEASE (HCC): Primary | ICD-10-CM

## 2024-09-08 RX ORDER — PREDNISONE 20 MG/1
TABLET ORAL
Qty: 40 TABLET | Refills: 1 | Status: SHIPPED | OUTPATIENT
Start: 2024-09-08

## 2024-09-29 ENCOUNTER — HOSPITAL ENCOUNTER (OUTPATIENT)
Dept: ULTRASOUND IMAGING | Facility: HOSPITAL | Age: 44
Discharge: HOME | End: 2024-09-29
Payer: COMMERCIAL

## 2024-09-29 DIAGNOSIS — E04.1 THYROID NODULE: ICD-10-CM

## 2024-09-29 PROCEDURE — 76536 US EXAM OF HEAD AND NECK: CPT

## 2025-04-16 ENCOUNTER — ANNUAL EXAM (OUTPATIENT)
Dept: GYNECOLOGY | Facility: CLINIC | Age: 45
End: 2025-04-16
Payer: COMMERCIAL

## 2025-04-16 VITALS
DIASTOLIC BLOOD PRESSURE: 90 MMHG | SYSTOLIC BLOOD PRESSURE: 120 MMHG | WEIGHT: 188.2 LBS | HEIGHT: 67 IN | BODY MASS INDEX: 29.54 KG/M2

## 2025-04-16 DIAGNOSIS — Z97.5 IUD (INTRAUTERINE DEVICE) IN PLACE: ICD-10-CM

## 2025-04-16 DIAGNOSIS — Z01.419 WOMEN'S ANNUAL ROUTINE GYNECOLOGICAL EXAMINATION: Primary | ICD-10-CM

## 2025-04-16 DIAGNOSIS — N91.1 SECONDARY AMENORRHEA: ICD-10-CM

## 2025-04-16 DIAGNOSIS — R92.333 HETEROGENEOUSLY DENSE TISSUE OF BOTH BREASTS ON MAMMOGRAPHY: ICD-10-CM

## 2025-04-16 DIAGNOSIS — Z12.31 ENCOUNTER FOR SCREENING MAMMOGRAM FOR BREAST CANCER: ICD-10-CM

## 2025-04-16 DIAGNOSIS — Z12.11 COLON CANCER SCREENING: ICD-10-CM

## 2025-04-16 PROCEDURE — 99396 PREV VISIT EST AGE 40-64: CPT | Performed by: OBSTETRICS & GYNECOLOGY

## 2025-04-16 RX ORDER — ROFLUMILAST 3 MG/G
AEROSOL, FOAM TOPICAL
COMMUNITY
Start: 2025-02-13

## 2025-04-16 NOTE — PROGRESS NOTES
Assessment & Plan   Diagnoses and all orders for this visit:    Women's annual routine gynecological examination    Encounter for screening mammogram for breast cancer  -     Mammo screening bilateral w 3d and cad; Future    Secondary amenorrhea    IUD (intrauterine device) in place    Heterogeneously dense tissue of both breasts on mammography    Colon cancer screening  -     Ambulatory referral to Gastroenterology; Future    Other orders  -     Zoryve 0.3 % FOAM    1.  Yearly exam-Pap smear deferred, self breast awareness reviewed, calcium/vitamin D recommendations discussed, mammogram request given, colonoscopy recommended.  2. Mirena IUD-status post removal with reinsertion by Dr. Sosa on 2020.  Had prior Mirena inserted .  IUD string was not seen again today similar to the prior visit from 4/10/2024.  Had subsequent ultrasound  on 2024 with normal results and IUD in good position.  He was happy with this device and will continue with it, confirmed FDA approval update years duration which would be 2028.  3. secondary amenorrhea-related to Mirena IUD.  She is very happy with this.  To call or return with any issues.  4.  History of thyroid nodule-noted, continues today on exam with enlarged thyroid.  Continue imaging and management as per treating Dr.  5. dense breast changes-noted on mammogram from 3/27/2024 with Tyrer-Cuzick risk of 17.3%.  Counseled about limited visualization and possible increased risk related to dense changes.  ABUS was reviewed and she will consider.  She will get mammogram done and if dense changes are found, we will mail ABUS request and ABUS sheet.  6. history of  x 4-Twin  at 34.3 weeks with follow-up deliveries at 36.1, 40, 38.6 weeks via .  7.  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  Twin girls age 19, boys are 16 and 12 and girl is 10. all doing well, congratulations given.  Follow-up 1  year for yearly exam or as needed.          Subjective   Patient ID: Kaylen Aldana is a 44 y.o. female.    Vitals:    25 0928   BP: 120/90     Patient was seen today for yearly exam.  Please see assessment plan for details.        The following portions of the patient's history were reviewed and updated as appropriate: allergies, current medications, past family history, past medical history, past social history, past surgical history, and problem list.  History reviewed. No pertinent past medical history.  Past Surgical History:   Procedure Laterality Date    ABDOMINAL SURGERY      APPENDECTOMY       SECTION      US GUIDED THYROID BIOPSY  10/4/2022    WISDOM TOOTH EXTRACTION       OB History    Para Term  AB Living   5 5 2 3 0 5   SAB IAB Ectopic Multiple Live Births      1 5      # Outcome Date GA Lbr Dimitris/2nd Weight Sex Type Anes PTL Lv   5 Term 14 38w6d   F CS-Unspec   KELLY   4 Term 10/11/12 40w0d  3884 g (8 lb 9 oz) M CS-Unspec   KELLY   3  08 36w1d  3317 g (7 lb 5 oz) M CS-Unspec   KELLY   2  05 34w3d  2580 g (5 lb 11 oz) F CS-Unspec   KELLY   1  05 34w3d  2211 g (4 lb 14 oz) F CS-Unspec   KELLY       Current Outpatient Medications:     ketorolac (TORADOL) 10 mg tablet, Take 1 tablet (10 mg total) by mouth every 6 (six) hours as needed for moderate pain, Disp: 30 tablet, Rfl: 3    Zoryve 0.3 % FOAM, , Disp: , Rfl:     levonorgestrel (MIRENA) 20 MCG/24HR IUD, 1 Intra Uterine Device by Intrauterine route once for 1 dose Pt will bring to her appt, Disp: 1 each, Rfl: 0    predniSONE 20 mg tablet, 20mg BID for 10 days then 20mg daily for 20 days, Disp: 40 tablet, Rfl: 1  Allergies   Allergen Reactions    Chlorhexidine Hives, Itching and Swelling     Social History     Socioeconomic History    Marital status: /Civil Union     Spouse name: None    Number of children: None    Years of education: None    Highest education level: None   Occupational  History    None   Tobacco Use    Smoking status: Never    Smokeless tobacco: Never   Vaping Use    Vaping status: Never Used   Substance and Sexual Activity    Alcohol use: Yes    Drug use: No    Sexual activity: Yes     Partners: Female     Birth control/protection: I.U.D.   Other Topics Concern    None   Social History Narrative    None     Social Drivers of Health     Financial Resource Strain: Not on file   Food Insecurity: Not on file   Transportation Needs: Not on file   Physical Activity: Not on file   Stress: Not on file   Social Connections: Not on file   Intimate Partner Violence: Not on file   Housing Stability: Not on file     Family History   Problem Relation Age of Onset    Hypertension Mother     Hyperlipidemia Mother     Hypertension Father     Hyperlipidemia Father     Arthritis Father     No Known Problems Daughter     No Known Problems Daughter     No Known Problems Daughter     Breast cancer Maternal Grandmother         age unknown    No Known Problems Maternal Grandfather     No Known Problems Paternal Grandmother     No Known Problems Paternal Grandfather     No Known Problems Maternal Aunt     No Known Problems Maternal Aunt     No Known Problems Paternal Aunt     No Known Problems Paternal Aunt     No Known Problems Paternal Aunt     No Known Problems Paternal Aunt        Review of Systems   Constitutional:  Negative for chills, diaphoresis, fatigue and fever.   Respiratory:  Negative for apnea, cough, chest tightness, shortness of breath and wheezing.    Cardiovascular:  Negative for chest pain, palpitations and leg swelling.   Gastrointestinal:  Negative for abdominal distention, abdominal pain, anal bleeding, constipation, diarrhea, nausea, rectal pain and vomiting.   Genitourinary:  Negative for difficulty urinating, dyspareunia, dysuria, frequency, hematuria, menstrual problem, pelvic pain, urgency, vaginal bleeding, vaginal discharge and vaginal pain.   Musculoskeletal:  Negative for  "arthralgias, back pain and myalgias.   Skin:  Negative for color change and rash.   Neurological:  Negative for dizziness, syncope, light-headedness, numbness and headaches.   Hematological:  Negative for adenopathy. Does not bruise/bleed easily.   Psychiatric/Behavioral:  Negative for dysphoric mood and sleep disturbance. The patient is not nervous/anxious.        Objective   Physical Exam  OBGyn Exam     Objective      /90 (BP Location: Right arm, Patient Position: Sitting)   Ht 5' 6.5\" (1.689 m)   Wt 85.4 kg (188 lb 3.2 oz)   BMI 29.92 kg/m²     General:   alert and oriented, in no acute distress   Neck: normal to inspection and palpation   Breast: normal appearance, no masses or tenderness   Heart:    Lungs:    Abdomen: soft, non-tender, without masses or organomegaly   Vulva: normal   Vagina: Without erythema or lesions or discharge.  Normal   Cervix: Without lesions or discharge or cervicitis.  No Cervical motion tenderness   Uterus: top normal size, anteverted, non-tender   Adnexa: no mass, fullness, tenderness   Rectum: negative    Psych:  Normal mood and affect   Skin:  Without obvious lesions   Eyes: symmetric, with normal movements and reactivity   Musculoskeletal:  Normal muscle tone and movements appreciated       "

## 2025-04-23 ENCOUNTER — TELEPHONE (OUTPATIENT)
Age: 45
End: 2025-04-23

## 2025-04-23 NOTE — TELEPHONE ENCOUNTER
04/23/25  Screened by: Tosha Cotto MA    Referring Provider RADHA    Pre- Screening:     There is no height or weight on file to calculate BMI.  Has patient been referred for a routine screening Colonoscopy? yes  Is the patient between 45-75 years old? yes      Previous Colonoscopy yes   If yes:    Date: approx 25 years ago    Facility: pt dont remember    Reason: bowel issues, hx of resection.          Does the patient want to see a Gastroenterologist prior to their procedure OR are they having any GI symptoms? no    Has the patient been hospitalized or had abdominal surgery in the past 6 months? no    Does the patient use supplemental oxygen? no    Does the patient take Coumadin, Lovenox, Plavix, Elliquis, Xarelto, or other blood thinning medication? no    Has the patient had a stroke, cardiac event, or stent placed in the past year? no      If patient is between 45yrs - 49yrs, please advise patient that we will have to confirm benefits & coverage with their insurance company for a routine screening colonoscopy.

## 2025-07-31 ENCOUNTER — HOSPITAL ENCOUNTER (OUTPATIENT)
Dept: MAMMOGRAPHY | Facility: IMAGING CENTER | Age: 45
Discharge: HOME | End: 2025-07-31
Payer: COMMERCIAL

## 2025-07-31 VITALS — WEIGHT: 188 LBS | HEIGHT: 66 IN | BODY MASS INDEX: 30.22 KG/M2

## 2025-07-31 DIAGNOSIS — Z12.31 ENCOUNTER FOR SCREENING MAMMOGRAM FOR BREAST CANCER: ICD-10-CM

## 2025-07-31 PROCEDURE — 77063 BREAST TOMOSYNTHESIS BI: CPT

## 2025-07-31 PROCEDURE — 77067 SCR MAMMO BI INCL CAD: CPT
